# Patient Record
Sex: FEMALE | Race: ASIAN | Employment: PART TIME | URBAN - METROPOLITAN AREA
[De-identification: names, ages, dates, MRNs, and addresses within clinical notes are randomized per-mention and may not be internally consistent; named-entity substitution may affect disease eponyms.]

---

## 2017-02-28 DIAGNOSIS — F41.8 DEPRESSION WITH ANXIETY: ICD-10-CM

## 2017-03-01 RX ORDER — ZOLPIDEM TARTRATE 5 MG/1
TABLET ORAL
Qty: 30 TABLET | Refills: 0 | Status: SHIPPED | OUTPATIENT
Start: 2017-03-01 | End: 2017-03-28

## 2017-03-01 NOTE — TELEPHONE ENCOUNTER
PHQ-9 SCORE 10/31/2016 11/7/2016   Total Score 22 10       AZIZA-7 SCORE 10/31/2016 11/7/2016   Total Score 18 8       Please inform patient, refill/prescriptioni approved. Please drop off prescription at pharmacy.   Please also update her PHQ 9  Thanks

## 2017-03-01 NOTE — TELEPHONE ENCOUNTER
Zolpidem      Last Written Prescription Date:  11/07/2016  Last Fill Quantity: 30,   # refills: 3  [place] [facility] [place]  [facility] prescribing provider: 12/05/2016  Future Office visit:       Routing refill request to provider for review/approval because:  Drug not on the [facility] or  Health refill protocol or controlled substance    Shalom JOHNSON (R)

## 2017-03-01 NOTE — TELEPHONE ENCOUNTER
Routing refill request to provider for review/approval because:  Drug not on the [facility] refill protocol       Tori Melton RN,   [facility]

## 2017-03-01 NOTE — TELEPHONE ENCOUNTER
Rx faxed to pharmacy, pt stopped in and filled out Phq90 and placed on Zeenat's desk for review.  Lizeth COREAA

## 2017-03-02 ASSESSMENT — ANXIETY QUESTIONNAIRES
3. WORRYING TOO MUCH ABOUT DIFFERENT THINGS: SEVERAL DAYS
6. BECOMING EASILY ANNOYED OR IRRITABLE: SEVERAL DAYS
5. BEING SO RESTLESS THAT IT IS HARD TO SIT STILL: SEVERAL DAYS
7. FEELING AFRAID AS IF SOMETHING AWFUL MIGHT HAPPEN: NOT AT ALL
2. NOT BEING ABLE TO STOP OR CONTROL WORRYING: SEVERAL DAYS
GAD7 TOTAL SCORE: 7
1. FEELING NERVOUS, ANXIOUS, OR ON EDGE: MORE THAN HALF THE DAYS

## 2017-03-02 ASSESSMENT — PATIENT HEALTH QUESTIONNAIRE - PHQ9: 5. POOR APPETITE OR OVEREATING: SEVERAL DAYS

## 2017-03-02 NOTE — TELEPHONE ENCOUNTER
PHQ-9 SCORE 10/31/2016 11/7/2016 3/2/2017   Total Score 22 10 10       AZIZA-7 SCORE 10/31/2016 11/7/2016 3/2/2017   Total Score 18 8 7     Reviewed scores and would like her to make a follow up appointment  Also is seeing counseling as well?  Thanks

## 2017-03-03 ASSESSMENT — ANXIETY QUESTIONNAIRES: GAD7 TOTAL SCORE: 7

## 2017-03-03 ASSESSMENT — PATIENT HEALTH QUESTIONNAIRE - PHQ9: SUM OF ALL RESPONSES TO PHQ QUESTIONS 1-9: 10

## 2017-03-06 NOTE — TELEPHONE ENCOUNTER
Called patient's number on file and was informed patient is currently at work. Will try again later with patient.    MATTHEW Stringer

## 2017-03-14 NOTE — TELEPHONE ENCOUNTER
Attempt #2    Left message for patient to call back to schedule an appointment.    Nimisha Verdugo CMA

## 2017-03-16 NOTE — TELEPHONE ENCOUNTER
Spoke with patient she knows she scored lower on the PHQ9 and the GAD7.  She wanted to write you a note but knew you would call her anyway.  She has been seeing a counselor anonymously through her work.  She has had 4 sessions and thinks she is now where she needs to be.  She said it has nothing to do with everything in the past.  It's about going back to school and not working for 9 months that is giving her anxiety, but it is a good anxiety.  She doesn't feel she needs a follow up appointment with you at this time.  She will call us if things change or worsen.    Message routed to Nany Verdugo CMA

## 2017-03-16 NOTE — TELEPHONE ENCOUNTER
PHQ-9 SCORE 10/31/2016 11/7/2016 3/2/2017   Total Score 22 10 10       AZIZA-7 SCORE 10/31/2016 11/7/2016 3/2/2017   Total Score 18 8 7   Noted

## 2017-03-28 DIAGNOSIS — G47.00 PERSISTENT DISORDER OF INITIATING OR MAINTAINING SLEEP: Primary | ICD-10-CM

## 2017-03-28 DIAGNOSIS — F41.8 DEPRESSION WITH ANXIETY: ICD-10-CM

## 2017-03-30 NOTE — TELEPHONE ENCOUNTER
[facility] Call Center    Phone Message    Name of Caller: Radha    Phone Number: Cell number on file:    Telephone Information:   Mobile none       Best time to return call: any    May a detailed message be left on voicemail: yes    Relation to patient: Self    Reason for Call: Other: Patient calling to follow up on refill request. Please advise.     Action Taken: Message routed to:  Primary Care p [phone]

## 2017-03-31 RX ORDER — ZOLPIDEM TARTRATE 5 MG/1
TABLET ORAL
Qty: 30 TABLET | Refills: 0 | Status: SHIPPED | OUTPATIENT
Start: 2017-03-31 | End: 2017-04-25

## 2017-03-31 NOTE — TELEPHONE ENCOUNTER
Routing refill request to provider for review/approval because:  Drug not on the [facility] refill protocol      Disp Refills Start End VINICIO      zolpidem (AMBIEN) 5 MG tablet 30 tablet 0 3/1/2017  No     Sig: TAKE 1 TABLET BY MOUTH AT BEDTIME AS NEEDED FOR SLEEP     Rupali Glover RN, [facility]

## 2017-05-01 ENCOUNTER — OFFICE VISIT (OUTPATIENT)
Dept: PEDIATRICS | Facility: CLINIC | Age: 27
End: 2017-05-01
Payer: COMMERCIAL

## 2017-05-01 VITALS
TEMPERATURE: 97.4 F | WEIGHT: 107.7 LBS | HEIGHT: 62 IN | DIASTOLIC BLOOD PRESSURE: 55 MMHG | BODY MASS INDEX: 19.82 KG/M2 | HEART RATE: 85 BPM | OXYGEN SATURATION: 98 % | SYSTOLIC BLOOD PRESSURE: 99 MMHG

## 2017-05-01 DIAGNOSIS — F41.8 DEPRESSION WITH ANXIETY: Primary | ICD-10-CM

## 2017-05-01 PROCEDURE — 99213 OFFICE O/P EST LOW 20 MIN: CPT | Performed by: NURSE PRACTITIONER

## 2017-05-01 ASSESSMENT — ANXIETY QUESTIONNAIRES
1. FEELING NERVOUS, ANXIOUS, OR ON EDGE: SEVERAL DAYS
GAD7 TOTAL SCORE: 6
6. BECOMING EASILY ANNOYED OR IRRITABLE: SEVERAL DAYS
5. BEING SO RESTLESS THAT IT IS HARD TO SIT STILL: SEVERAL DAYS
2. NOT BEING ABLE TO STOP OR CONTROL WORRYING: SEVERAL DAYS
3. WORRYING TOO MUCH ABOUT DIFFERENT THINGS: SEVERAL DAYS
7. FEELING AFRAID AS IF SOMETHING AWFUL MIGHT HAPPEN: NOT AT ALL

## 2017-05-01 ASSESSMENT — PATIENT HEALTH QUESTIONNAIRE - PHQ9: 5. POOR APPETITE OR OVEREATING: SEVERAL DAYS

## 2017-05-01 NOTE — PATIENT INSTRUCTIONS
PLAN:   1.   Symptomatic therapy suggested: Continue current medications.  2.  Orders Placed This Encounter   Procedures     MENTAL HEALTH REFERRAL       3. Patient needs to follow up in if no improvement,or sooner if worsening of symptoms or other symptoms develop.  CONSULTATION/REFERRAL to counselor  Schedule a physical in the next 6 months.    It was a pleasure seeing you today at the [facility]. Thank you for allowing us to care for you today. We truly hope we provided you with the excellent service you deserve. Please let us know if there is anything else we can do for you so we can be sure you are leaving completley satisfied with your care experience.       General information about your clinic   Clinic Hours Lab Hours (Appointments are required)   Mon-Thurs: 7:30 AM - 7 PM Mon-Thurs: 7:30 AM - 7 PM   Fri: 7:30 AM - 5 PM Fri: 7:30 AM - 5 PM        After Hours Nurse Advise & Appts:  Patricia Nurse Advisors: [phone]  Patricia On Call: to make appointments anytime: [phone] On Call Physician: call [phone] and answering service will page the on call physician.        For urgent appointments, please call [phone] and ask for the triage nurse or your care team clinic nurse.  How to contact my care team:  MyChart: [email or web address]   Phone: [phone]   Fax: [phone]       [facility]:   Phone: [phone]  Hours: 8:00 AM - 6:00 PM  Medication Refills:  Call your pharmacy and they will forward the refill to us. Please allow 3 business days for your refills to be completed.       Normal or non-critical lab and imaging results will be communicated to you by MyChart, letter or phone within 7 days.  If you do not hear from us within 10 days, please call the clinic. If you have a critical or abnormal lab result, we will notify you by phone as soon as possible.       We now have PWIC (Pediatric Walk in Care)  Monday-Friday from 7:30-4. Simply walk in  and be seen for your urgent needs like cough, fever, rash, diarrhea or vomiting, pink eye, UTI. No appointments needed. Ask one of the team for more information      -Your Care Team:    Dr. Tony Tafoya - Internal Medicine  Dr. Caitlyn Dumont - Internal Medicine/Pediatrics   Dr. Alex Eastman - Family Medicine  Dr. Cee Perez - Pediatrics  Dr. Jayla Vasquez - Pediatrics  Nany Call CNP - Family Practice Nurse Practitioner

## 2017-05-01 NOTE — NURSING NOTE
"Chief Complaint   Patient presents with     Depression     Anxiety       Initial BP 99/55 (BP Location: Right arm, Patient Position: Chair, Cuff Size: Adult Regular)  Pulse 85  Temp 97.4  F (36.3  C) (Temporal)  Ht 5' 2.25\" (1.581 m)  Wt 107 lb 11.2 oz (48.9 kg)  LMP 04/04/2017  SpO2 98%  Breastfeeding? No  BMI 19.54 kg/m2 Estimated body mass index is 19.54 kg/(m^2) as calculated from the following:    Height as of this encounter: 5' 2.25\" (1.581 m).    Weight as of this encounter: 107 lb 11.2 oz (48.9 kg).  Medication Reconciliation: complete      MATTHEW Stringer      "

## 2017-05-01 NOTE — PROGRESS NOTES
SUBJECTIVE:                                                    Radha Galvan is a 27 year old female who presents to clinic today for the following health issues:    Depression and Anxiety Follow-Up    Status since last visit: No change    Other associated symptoms:still feel anxious    Complicating factors:     Significant life event: Yes-  Going back to school full time at the end of the summer      Current substance abuse: None    PHQ-9 SCORE 11/7/2016 3/2/2017 5/1/2017   Total Score 10 10 7     AZIZA-7 SCORE 11/7/2016 3/2/2017 5/1/2017   Total Score 8 7 6     Has been seeing counseling initially through [facility] and now only sporadically with new counselor   Went back to school and working full time and harder now to make time to do this  Feels like only anxiety now is going back to full time student and not have a full time job.   Will be graduating next year if all goes well.   The ambien helps some to help her sleep   Has been using more of her PTO lately but not because she is sad but mostly to catch up on school work. 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   Been trying to go to the gym more as well.   Was on the Lexapro and then after seeing the counselor she felt good enough to quit the medication    PHQ-9  English      PHQ-9   Any Language     GAD7       Amount of exercise or physical activity: 4-5 days/week for an average of greater than 60 minutes    Problems taking medications regularly: No    Medication side effects: none    Diet: cutting out diary products      Problem list and histories reviewed & adjusted, as indicated.  Additional history: as documented    Patient Active Problem List   Diagnosis     Depression with anxiety     History reviewed. No pertinent surgical history.    Social History   Substance Use Topics     Smoking status: Current Every Day Smoker     Packs/day: 0.30     Types: Cigarettes      "Smokeless tobacco: Never Used     Alcohol use 0.0 oz/week     0 Standard drinks or equivalent per week      Comment: 3-5 glasses a night     History reviewed. No pertinent family history.      Current Outpatient Prescriptions   Medication Sig Dispense Refill     zolpidem (AMBIEN) 5 MG tablet TAKE 1 TABLET BY MOUTH AT BEDTIME AS NEEDED FOR SLEEP 30 tablet 0     albuterol (ALBUTEROL) 108 (90 BASE) MCG/ACT inhaler Inhale 1 puff into the lungs daily       Omega-3 Fatty Acids (OMEGA-3 FISH OIL PO)        Allergies   Allergen Reactions     Hmg-Coa-R Inhibitors      \"cockroaches     Isoflavones      Mold      Ragweeds Itching     Sulfa Drugs        Reviewed and updated as needed this visit by clinical staff  Tobacco  Allergies  Meds  Med Hx  Surg Hx  Fam Hx  Soc Hx      Reviewed and updated as needed this visit by Provider         ROS:  CONSTITUTIONAL:NEGATIVE for fever, chills, change in weight  RESP:NEGATIVE for significant cough or SOB  CV: NEGATIVE for chest pain, palpitations or peripheral edema  GI: NEGATIVE for nausea, abdominal pain, heartburn, or change in bowel habits  MUSCULOSKELETAL: NEGATIVE for significant arthralgias or myalgia  NEURO: NEGATIVE for weakness, dizziness or paresthesias  PSYCHIATRIC: NEGATIVE for changes in mood or affect and no thoughts of hurting self or others  POSITIVE forHx anxiety and Hx depression    OBJECTIVE:                                                    BP 99/55 (BP Location: Right arm, Patient Position: Chair, Cuff Size: Adult Regular)  Pulse 85  Temp 97.4  F (36.3  C) (Temporal)  Ht 5' 2.25\" (1.581 m)  Wt 107 lb 11.2 oz (48.9 kg)  LMP 04/04/2017  SpO2 98%  Breastfeeding? No  BMI 19.54 kg/m2  Body mass index is 19.54 kg/(m^2).  GENERAL APPEARANCE: alert, active and no distress  NECK: no adenopathy and thyroid normal to palpation  RESP: lungs clear to auscultation - no rales, rhonchi or wheezes  CV: regular rates and rhythm  MS: extremities normal- no gross " deformities noted  NEURO: Normal strength and tone, mentation intact and speech normal  PSYCH: mentation appears normal and affect normal/bright  MENTAL STATUS EXAM:  Appearance/Behavior: No apparent distress, Casually groomed and Dressed appropriately for weather  Speech: Normal  Mood/Affect: normal affect  Insight: Adequate    Diagnostic Test Results:  none      ASSESSMENT/PLAN:                                                      Radha was seen today for depression and anxiety.    Diagnoses and all orders for this visit:    Depression with anxiety  -     MENTAL HEALTH REFERRAL  PLAN:   1.   Reviewed concept of depression as function of biochemical imbalance of neurotransmitters/rationale for treatment.  Risks and benefits of medication(s) reviewed with patient.  Questions answered.  Counseling advised  Refer to psychologist  Followup appointment in 6 month(s)  Patient advised immediate presentation to hospital for suicidal thought, etc.      Symptomatic therapy suggested: Continue current medications.  2.  Orders Placed This Encounter   Procedures     MENTAL HEALTH REFERRAL       3. Patient needs to follow up in if no improvement,or sooner if worsening of symptoms or other symptoms develop.  CONSULTATION/REFERRAL to counselor  Schedule a physical in the next 6 months.    See Patient Instructions    YVES Villarreal [facility]

## 2017-05-01 NOTE — MR AVS SNAPSHOT
After Visit Summary   5/1/2017    Radha Galvan    MRN: [id number]           Patient Information     Date Of Birth          1990        Visit Information        Provider Department      5/1/2017 1:40 PM Nany Call APRN [facility]        Today's Diagnoses     Depression with anxiety    -  1      Care Instructions    PLAN:   1.   Symptomatic therapy suggested: Continue current medications.  2.  Orders Placed This Encounter   Procedures     MENTAL HEALTH REFERRAL       3. Patient needs to follow up in if no improvement,or sooner if worsening of symptoms or other symptoms develop.  CONSULTATION/REFERRAL to counselor  Schedule a physical in the next 6 months.    It was a pleasure seeing you today at the [facility]. Thank you for allowing us to care for you today. We truly hope we provided you with the excellent service you deserve. Please let us know if there is anything else we can do for you so we can be sure you are leaving completley satisfied with your care experience.       General information about your clinic   Clinic Hours Lab Hours (Appointments are required)   Mon-Thurs: 7:30 AM - 7 PM Mon-Thurs: 7:30 AM - 7 PM   Fri: 7:30 AM - 5 PM Fri: 7:30 AM - 5 PM        After Hours Nurse Advise & Appts:  Patricia Nurse Advisors: [phone]  Patricia On Call: to make appointments anytime: [phone] On Call Physician: call [phone] and answering service will page the on call physician.        For urgent appointments, please call [phone] and ask for the triage nurse or your care team clinic nurse.  How to contact my care team:  MyChart: [email or web address]   Phone: [phone]   Fax: [phone]       [facility]:   Phone: [phone]  Hours: 8:00 AM - 6:00 PM  Medication Refills:  Call your pharmacy and they will forward the refill to us. Please allow 3 business days for your refills to be completed.        Normal or non-critical lab and imaging results will be communicated to you by MyChart, letter or phone within 7 days.  If you do not hear from us within 10 days, please call the clinic. If you have a critical or abnormal lab result, we will notify you by phone as soon as possible.       We now have PWIC (Pediatric Walk in Care)  Monday-Friday from 7:30-4. Simply walk in and be seen for your urgent needs like cough, fever, rash, diarrhea or vomiting, pink eye, UTI. No appointments needed. Ask one of the team for more information      -Your Care Team:    Dr. Tony Tafoya - Internal Medicine  Dr. Caitlyn Dumont - Internal Medicine/Pediatrics   Dr. Alex Eastman - Family Medicine  Dr. Cee Perez - Pediatrics  Dr. Jayla Vasquez - Pediatrics  Nany Call CNP - Family Practice Nurse Practitioner                         Follow-ups after your visit        Additional Services     MENTAL HEALTH REFERRAL       Your provider has referred you to: [facility]: [facility] - Counseling (Individual/Couples/Family) - [facility]     All scheduling is subject to the client's specific insurance plan & benefits, provider/location availability, and provider clinical specialities.  Please arrive 15 minutes early for your first appointment and bring your completed paperwork.    Please be aware that coverage of these services is subject to the terms and limitations of your health insurance plan.  Call member services at your health plan with any benefit or coverage questions.                  Who to contact     If you have questions or need follow up information about today's clinic visit or your schedule please contact [facility] directly at [phone].  Normal or non-critical lab and imaging results will be communicated to you by MyChart, letter or phone within 4 business days after the clinic has received the results. If you do not hear from us within 7 days, please contact the clinic through  "MyChart or phone. If you have a critical or abnormal lab result, we will notify you by phone as soon as possible.  Submit refill requests through [employer] or call your pharmacy and they will forward the refill request to us. Please allow 3 business days for your refill to be completed.          Additional Information About Your Visit        [employer]ent Information     Blazent is an electronic gateway that provides easy, online access to your medical records. With Blazent, you can request a clinic appointment, read your test results, renew a prescription or communicate with your care team.     To sign up for [employer] visit the website at [email or web address]   You will be asked to enter the access code listed below, as well as some personal information. Please follow the directions to create your username and password.     Your access code is: [email or web address]  Expires: 2017  2:16 PM     Your access code will  in 90 days. If you need help or a new code, please contact your [facility] or call [phone] for assistance.        Care EveryWhere ID     This is your Care EveryWhere ID. This could be used by other organizations to access your [employer] medical records  [id number]        Your Vitals Were     Pulse Temperature Height Last Period Pulse Oximetry Breastfeeding?    85 97.4  F (36.3  C) (Temporal) 5' 2.25\" (1.581 m) 2017 98% No    BMI (Body Mass Index)                   19.54 kg/m2            Blood Pressure from Last 3 Encounters:   17 99/55   16 100/70   10/31/16 96/60    Weight from Last 3 Encounters:   17 107 lb 11.2 oz (48.9 kg)   16 103 lb 8 oz (46.9 kg)   10/31/16 100 lb 3.2 oz (45.5 kg)              We Performed the Following     MENTAL HEALTH REFERRAL        Primary Care Provider Office Phone # Fax #    YVES Villarreal [phone]       [facility] [address]  [facility] [address]   "      Thank you!     Thank you for choosing [facility]  for your care. Our goal is always to provide you with excellent care. Hearing back from our patients is one way we can continue to improve our services. Please take a few minutes to complete the written survey that you may receive in the mail after your visit with us. Thank you!             Your Updated Medication List - Protect others around you: Learn how to safely use, store and throw away your medicines at www.disposemymeds.org.          This list is accurate as of: 5/1/17  2:16 PM.  Always use your most recent med list.                   Brand Name Dispense Instructions for use    albuterol 108 (90 BASE) MCG/ACT Inhaler   Generic drug:  albuterol      Inhale 1 puff into the lungs daily       OMEGA-3 FISH OIL PO          zolpidem 5 MG tablet    AMBIEN    30 tablet    TAKE 1 TABLET BY MOUTH AT BEDTIME AS NEEDED FOR SLEEP

## 2017-05-02 ASSESSMENT — PATIENT HEALTH QUESTIONNAIRE - PHQ9: SUM OF ALL RESPONSES TO PHQ QUESTIONS 1-9: 7

## 2017-05-02 ASSESSMENT — ANXIETY QUESTIONNAIRES: GAD7 TOTAL SCORE: 6

## 2017-06-13 DIAGNOSIS — F41.8 DEPRESSION WITH ANXIETY: ICD-10-CM

## 2017-06-13 RX ORDER — ZOLPIDEM TARTRATE 5 MG/1
TABLET ORAL
Qty: 30 TABLET | Refills: 0 | Status: SHIPPED | OUTPATIENT
Start: 2017-06-20 | End: 2017-07-10

## 2017-06-13 NOTE — TELEPHONE ENCOUNTER
Routing refill request to provider for review/approval because:  Drug not on the [facility] refill protocol     zolpidem (AMBIEN) 5 MG tablet  Last Written Prescription Date: 5/22/2017  Last Fill Quantity: 30,  # refills: 0   [place] [facility] [place] [facility] prescribing provider: 5/1/2017    Kendra Ivan RN

## 2017-06-13 NOTE — TELEPHONE ENCOUNTER
Patient advised script for Ambien faxed to [facility] pharmacy and cannot fill until 06/22 due to last fill was on 05/22/17.  Patient stated understanding.    Nimisha Verdugo CMA

## 2017-06-13 NOTE — TELEPHONE ENCOUNTER
Please inform patient, refill/prescriptioni approved. Please fax prescription to designated pharmacy.   However note last refill was 5/22 so can not fill until next week

## 2017-07-10 DIAGNOSIS — F41.8 DEPRESSION WITH ANXIETY: ICD-10-CM

## 2017-07-10 RX ORDER — ZOLPIDEM TARTRATE 5 MG/1
TABLET ORAL
Qty: 30 TABLET | Refills: 0 | Status: SHIPPED | OUTPATIENT
Start: 2017-07-20 | End: 2017-08-10

## 2017-07-10 NOTE — TELEPHONE ENCOUNTER
Routing refill request to provider for review/approval because:  Drug not on the [facility] refill protocol     Zolpidem      Last Written Prescription Date: 6/20/17  Last Fill Quantity: 30,  # refills: 0   Last Office Visit with [facility], [facility] or [facility] prescribing provider: 5/1/17         Rupali Glover RN, [facility]

## 2017-07-11 NOTE — TELEPHONE ENCOUNTER
Please inform patient, refill/prescriptioni approved. Please fax prescription to designated pharmacy.   However last script was on 6/20 so can not fill until next week.

## 2017-07-11 NOTE — TELEPHONE ENCOUNTER
Patient advised Ambien script faxed to [facility] pharmacy.  Can not fill script until next week due to last script was on 06/20.      Nimisha Verdugo CMA

## 2017-08-09 DIAGNOSIS — F41.8 DEPRESSION WITH ANXIETY: ICD-10-CM

## 2017-08-09 RX ORDER — ZOLPIDEM TARTRATE 5 MG/1
TABLET ORAL
Qty: 30 TABLET | Refills: 0 | Status: CANCELLED | OUTPATIENT
Start: 2017-08-09

## 2017-08-09 NOTE — TELEPHONE ENCOUNTER
[facility] Call Center    Phone Message    Name of Caller: Radha    Phone Number: Home number on file [phone] (home)    Best time to return call: any    May a detailed message be left on voicemail: yes    Relation to patient: Self    Reason for Call: Patient is requesting Refill: Zolpidem, Ambien, 5 mg Tablet, patient taking last tablet today  Pharm: [facility]  Action Taken: Message routed to:  Primary Care p [phone]

## 2017-08-10 RX ORDER — ZOLPIDEM TARTRATE 5 MG/1
5 TABLET ORAL
Qty: 30 TABLET | Refills: 0 | Status: SHIPPED | OUTPATIENT
Start: 2017-08-19 | End: 2017-09-15

## 2017-08-10 NOTE — TELEPHONE ENCOUNTER
Please inform patient, refill/prescriptioni approved to be filled on 8/19 as too early for refill at this time . Please fax prescription to designated pharmacy.

## 2017-08-10 NOTE — TELEPHONE ENCOUNTER
Left voicemail message for patient that the script she requested has been sent to [facility] and is ok to be filled on 08/19/17 as too early for refill at this time per Zeenat Call.    Nimisha Verdugo CMA

## 2017-08-10 NOTE — TELEPHONE ENCOUNTER
Routing refill request to provider for review/approval because:  Drug not on the [facility] refill protocol   MARYLOUUMU Zeenat, pt stated she took her last pill yesterday 8/9, but if patient were taking 1 tab daily she should still have 9-10 tablets left    Zolpidem (AMBIEN) 5 mg tablet   Last Written Prescription Date: 7/20/2017  Last Fill Quantity: 30,  # refills: 0   Last Office Visit with [facility] or [facility] prescribing provider: 5/1/2017                                           Kendra Ivan RN

## 2017-09-15 DIAGNOSIS — F41.8 DEPRESSION WITH ANXIETY: ICD-10-CM

## 2017-09-15 RX ORDER — ZOLPIDEM TARTRATE 5 MG/1
TABLET ORAL
Qty: 30 TABLET | Refills: 1 | Status: SHIPPED | OUTPATIENT
Start: 2017-09-15 | End: 2017-11-08

## 2017-09-15 NOTE — TELEPHONE ENCOUNTER
Please inform patient, refill/prescriptioni approved. Please fax prescription to designated pharmacy.   Please ask provider there sign off

## 2017-09-17 ENCOUNTER — HEALTH MAINTENANCE LETTER (OUTPATIENT)
Age: 27
End: 2017-09-17

## 2017-11-08 DIAGNOSIS — F41.8 DEPRESSION WITH ANXIETY: ICD-10-CM

## 2017-11-08 RX ORDER — ZOLPIDEM TARTRATE 5 MG/1
TABLET ORAL
Qty: 30 TABLET | Refills: 1 | Status: SHIPPED | OUTPATIENT
Start: 2017-11-08 | End: 2018-01-04

## 2017-11-08 NOTE — TELEPHONE ENCOUNTER
Routing refill request to provider for review/approval because:  Drug not on the [facility] refill protocol     zolpidem (AMBIEN) 5 MG tablet    Last Written Prescription Date: 9/15/2017  Last Fill Quantity: 30,  # refills: 1   Last [facility] Visit with [facility] or [facility] prescribing provider: 5/1/2017                                           Kendra Ivan RN

## 2018-01-04 DIAGNOSIS — F41.8 DEPRESSION WITH ANXIETY: ICD-10-CM

## 2018-01-04 NOTE — TELEPHONE ENCOUNTER
Patient is calling to follow up on refill request. Patient informed of 72 hour turn around time. Please advise.

## 2018-01-05 RX ORDER — ZOLPIDEM TARTRATE 5 MG/1
TABLET ORAL
Qty: 30 TABLET | Refills: 1 | Status: SHIPPED | OUTPATIENT
Start: 2018-01-05 | End: 2018-02-21

## 2018-01-05 NOTE — TELEPHONE ENCOUNTER
Routing refill request to provider for review/approval because:  Drug not on the [facility] refill protocol     zolpidem (AMBIEN) 5 MG tablet  Last Written Prescription Date: 11/8/2017  Last Fill Quantity: 30,  # refills: 1   Last Office Visit with [facility], [facility] or [facility] prescribing provider: 5/1/2017    Kendra Ivan RN

## 2018-02-21 ENCOUNTER — TELEPHONE (OUTPATIENT)
Dept: PEDIATRICS | Facility: CLINIC | Age: 28
End: 2018-02-21

## 2018-02-21 DIAGNOSIS — F41.8 DEPRESSION WITH ANXIETY: ICD-10-CM

## 2018-02-21 DIAGNOSIS — G47.00 INSOMNIA, UNSPECIFIED TYPE: Primary | ICD-10-CM

## 2018-02-21 RX ORDER — ZOLPIDEM TARTRATE 5 MG/1
5 TABLET ORAL
Qty: 30 TABLET | Refills: 1 | Status: SHIPPED | OUTPATIENT
Start: 2018-02-21 | End: 2018-04-15

## 2018-02-21 NOTE — TELEPHONE ENCOUNTER
Patient would like to request a refill for zolpidem (AMBIEN) 5 MG tablet [[id number]] (Order [id number])  [facility] [address] in [place]. Please advise. She is currently out.

## 2018-02-21 NOTE — TELEPHONE ENCOUNTER
zolpidem (AMBIEN) 5 MG tablet     Last Written Prescription Date:  1/5/18  Last Fill Quantity: 30,   # refills: 1  Last Office Visit: 5/1/17  Future Office visit:  No future appointment    Routing refill request to provider for review/approval because:  Drug not on the [facility], [facility] or [facility] refill protocol or controlled substance    MATTHEW Stringer

## 2018-02-22 DIAGNOSIS — G47.00 INSOMNIA, UNSPECIFIED TYPE: ICD-10-CM

## 2018-02-22 RX ORDER — ZOLPIDEM TARTRATE 5 MG/1
TABLET ORAL
Qty: 30 TABLET | Refills: 0 | OUTPATIENT
Start: 2018-02-22

## 2018-02-22 NOTE — TELEPHONE ENCOUNTER
Duplicate Request- this refill (same medication, sig, and indication) has already been processed and sent to pharmacy within the past today.    Geetha Rock CMA        2/21/18 12:22 PM   Note      Script faxed to pharmacy. Patient informed of script faxed.     MATTHEW Stringer            Last Written Prescription Date:  zolpidem (AMBIEN) 5 MG tablet 30 tablet 1 2/21/2018  No      Sig: Take 1 tablet (5 mg) by mouth nightly as needed     Class: Local Print     Route: Oral     Order: [id number]       Printout Tracking      External Result Report       Pharmacy      [facility] [id number] [facility] [address] [facility] [address]            Medication refused.    Tori Melton RN,   M. [facility]

## 2018-04-15 DIAGNOSIS — G47.00 INSOMNIA, UNSPECIFIED TYPE: ICD-10-CM

## 2018-04-16 RX ORDER — ZOLPIDEM TARTRATE 5 MG/1
TABLET ORAL
Qty: 30 TABLET | Refills: 1 | Status: SHIPPED | OUTPATIENT
Start: 2018-04-16 | End: 2018-06-10

## 2018-04-16 NOTE — TELEPHONE ENCOUNTER
Ambien script faxed to [facility] pharmacy [address].    Patient advised due for appt before next refill.    Nimisha Verdugo CMA

## 2018-04-16 NOTE — TELEPHONE ENCOUNTER
Please inform patient, refill/prescriptioni approved. Please fax prescription to designated pharmacy.   Will need follow up appointment before next refill

## 2018-04-16 NOTE — TELEPHONE ENCOUNTER
Routing refill request to provider for review/approval because:  Drug not on the [facility] refill protocol     zolpidem (AMBIEN) 5 MG tablet 30 tablet 1 2/21/2018  No   Sig: Take 1 tablet (5 mg) by mouth nightly as needed   Class: Local Print     Last OV with Nany Call CNP: 5/1/2017    No future apts.     Kendra Ivan RN

## 2018-06-10 DIAGNOSIS — G47.00 INSOMNIA, UNSPECIFIED TYPE: ICD-10-CM

## 2018-06-11 RX ORDER — ZOLPIDEM TARTRATE 5 MG/1
TABLET ORAL
Qty: 30 TABLET | Refills: 0 | Status: SHIPPED | OUTPATIENT
Start: 2018-06-11 | End: 2018-07-09

## 2018-06-11 NOTE — TELEPHONE ENCOUNTER
Script faxed to pharmacy. Called and left VM for patient to call back to schedule an appointment with pcp.    MATTHEW Stringer

## 2018-06-11 NOTE — TELEPHONE ENCOUNTER
Routing refill request to provider for review/approval because:  Drug not on the [facility] refill protocol     zolpidem (AMBIEN) 5 MG tablet 30 tablet 1 4/16/2018     Sig: TAKE 1 TABLET BY MOUTH NIGHTLY AS NEEDED    Class: Local Print      Last OV with Nany Call CNP: 5/1/2017    No future apts.     Kendra Ivan RN

## 2018-06-12 NOTE — TELEPHONE ENCOUNTER
Future Office visit:    Next 5 appointments (look out 90 days)     Jun 22, 2018  9:10 AM CDT   Return Visit with YVES Villarreal CNP   [facility]    [id number] [place] [id number]   [phone]

## 2018-07-09 DIAGNOSIS — G47.00 INSOMNIA, UNSPECIFIED TYPE: ICD-10-CM

## 2018-07-09 RX ORDER — ZOLPIDEM TARTRATE 5 MG/1
TABLET ORAL
Qty: 30 TABLET | Refills: 0 | Status: SHIPPED | OUTPATIENT
Start: 2018-07-09 | End: 2018-08-06

## 2018-07-09 NOTE — LETTER
July 19, 2018      Radha Galvan  [address]  [place] [address]        Dear Radha Galvan,    The refill request made by your pharmacy was received at the clinic. At this time you are due in the clinic for a follow up appointment. A one time joanne refill has been sent to your pharmacy.     In order to ensure that we are providing the best quality care, we would like to remind you that you are due for Return appointment with Zeenat Call.      We have been unable to reach you by phone. Please call your clinic or use [employer] to make an appointment with your provider before you run out of medication. This will prevent a delay in your next refill. Please let us know if you have any questions and we would be happy to help.     Thank you for trusting us with your care.    Sincerely,     [facility] [place] [facility]  [phone]

## 2018-07-09 NOTE — TELEPHONE ENCOUNTER
Please inform patient, refill/prescriptioni approved. Please fax prescription to designated pharmacy.   Will need appointment before next refill can be given

## 2018-07-09 NOTE — TELEPHONE ENCOUNTER
Routing refill request to provider for review/approval because:  Drug not on the [facility] refill protocol     zolpidem (AMBIEN) 5 MG tablet 30 tablet 0 6/11/2018  No   Sig: TAKE 1 TABLET BY MOUTH NIGHTLY AS NEEDED     Last OV with Nany Call CNP: 5/1/2017    No future apts.     Kendra Ivan RN

## 2018-07-10 NOTE — TELEPHONE ENCOUNTER
Attempt # 1    Left message for patient stating prescription has been sent to the pharmacy. Advised patient to call clinic to schedule Return appointment with Zeenat Fuller. Clinic number and Mychart option given.    Raisa Stark  Primary Care   [facility] [place]

## 2018-07-19 NOTE — TELEPHONE ENCOUNTER
Attempt #3    No appointment made.  Chart letter created and sent.    Raisa Stark  Primary Care   [facility]

## 2018-08-06 DIAGNOSIS — G47.00 INSOMNIA, UNSPECIFIED TYPE: ICD-10-CM

## 2018-08-06 RX ORDER — ZOLPIDEM TARTRATE 5 MG/1
TABLET ORAL
Qty: 30 TABLET | Refills: 0 | Status: SHIPPED | OUTPATIENT
Start: 2018-08-06 | End: 2018-08-17

## 2018-08-06 NOTE — TELEPHONE ENCOUNTER
Patient would like to know if she can get a joanne refill until appointment on Aug 17th. Please call patient at [phone].

## 2018-08-06 NOTE — TELEPHONE ENCOUNTER
Routing refill request to provider for review/approval because:  Drug not on the [facility] refill protocol     zolpidem (AMBIEN) 5 MG tablet 30 tablet 0 7/9/2018  No   Sig: TAKE 1 TABLET BY MOUTH EVERY DAY AT BEDTIME AS NEEDED     Last OV with Nany Call CNP: 5/1/2017    Next 5 appointments (look out 90 days)     Aug 17, 2018  9:10 AM CDT   Return Visit with YVES Villarreal CNP   [facility]    [address] [place] [id number]   [phone]                Kendra Ivan RN

## 2018-08-17 ENCOUNTER — OFFICE VISIT (OUTPATIENT)
Dept: PEDIATRICS | Facility: CLINIC | Age: 28
End: 2018-08-17
Payer: COMMERCIAL

## 2018-08-17 VITALS
OXYGEN SATURATION: 96 % | SYSTOLIC BLOOD PRESSURE: 102 MMHG | HEART RATE: 78 BPM | TEMPERATURE: 98.2 F | HEIGHT: 62 IN | DIASTOLIC BLOOD PRESSURE: 66 MMHG | BODY MASS INDEX: 18.29 KG/M2 | WEIGHT: 99.4 LBS

## 2018-08-17 DIAGNOSIS — G47.00 INSOMNIA, UNSPECIFIED TYPE: ICD-10-CM

## 2018-08-17 DIAGNOSIS — Z13.1 SCREENING FOR DIABETES MELLITUS: ICD-10-CM

## 2018-08-17 DIAGNOSIS — Z13.6 CARDIOVASCULAR SCREENING; LDL GOAL LESS THAN 160: ICD-10-CM

## 2018-08-17 DIAGNOSIS — L30.9 ECZEMA, UNSPECIFIED TYPE: ICD-10-CM

## 2018-08-17 DIAGNOSIS — F41.8 DEPRESSION WITH ANXIETY: Primary | ICD-10-CM

## 2018-08-17 DIAGNOSIS — Z13.29 SCREENING FOR THYROID DISORDER: ICD-10-CM

## 2018-08-17 DIAGNOSIS — R63.4 LOSS OF WEIGHT: ICD-10-CM

## 2018-08-17 LAB
ALBUMIN SERPL-MCNC: 4.2 G/DL (ref 3.4–5)
ALP SERPL-CCNC: 74 U/L (ref 40–150)
ALT SERPL W P-5'-P-CCNC: 23 U/L (ref 0–50)
ANION GAP SERPL CALCULATED.3IONS-SCNC: 6 MMOL/L (ref 3–14)
AST SERPL W P-5'-P-CCNC: 21 U/L (ref 0–45)
BILIRUB SERPL-MCNC: 0.4 MG/DL (ref 0.2–1.3)
BUN SERPL-MCNC: 16 MG/DL (ref 7–30)
CALCIUM SERPL-MCNC: 8.8 MG/DL (ref 8.5–10.1)
CHLORIDE SERPL-SCNC: 107 MMOL/L (ref 94–109)
CHOLEST SERPL-MCNC: 158 MG/DL
CO2 SERPL-SCNC: 28 MMOL/L (ref 20–32)
CREAT SERPL-MCNC: 0.53 MG/DL (ref 0.52–1.04)
DEPRECATED CALCIDIOL+CALCIFEROL SERPL-MC: 39 UG/L (ref 20–75)
ERYTHROCYTE [DISTWIDTH] IN BLOOD BY AUTOMATED COUNT: 11.6 % (ref 10–15)
GFR SERPL CREATININE-BSD FRML MDRD: >90 ML/MIN/1.7M2
GLUCOSE SERPL-MCNC: 83 MG/DL (ref 70–99)
HCT VFR BLD AUTO: 41.3 % (ref 35–47)
HDLC SERPL-MCNC: 113 MG/DL
HGB BLD-MCNC: 13.6 G/DL (ref 11.7–15.7)
LDLC SERPL CALC-MCNC: 36 MG/DL
MCH RBC QN AUTO: 32.3 PG (ref 26.5–33)
MCHC RBC AUTO-ENTMCNC: 32.9 G/DL (ref 31.5–36.5)
MCV RBC AUTO: 98 FL (ref 78–100)
NONHDLC SERPL-MCNC: 45 MG/DL
PLATELET # BLD AUTO: 247 10E9/L (ref 150–450)
POTASSIUM SERPL-SCNC: 4.1 MMOL/L (ref 3.4–5.3)
PROT SERPL-MCNC: 7.6 G/DL (ref 6.8–8.8)
RBC # BLD AUTO: 4.21 10E12/L (ref 3.8–5.2)
SODIUM SERPL-SCNC: 141 MMOL/L (ref 133–144)
TRIGL SERPL-MCNC: 46 MG/DL
TSH SERPL DL<=0.005 MIU/L-ACNC: 0.81 MU/L (ref 0.4–4)
WBC # BLD AUTO: 7.8 10E9/L (ref 4–11)

## 2018-08-17 PROCEDURE — 80061 LIPID PANEL: CPT | Performed by: NURSE PRACTITIONER

## 2018-08-17 PROCEDURE — 84443 ASSAY THYROID STIM HORMONE: CPT | Performed by: NURSE PRACTITIONER

## 2018-08-17 PROCEDURE — 36415 COLL VENOUS BLD VENIPUNCTURE: CPT | Performed by: NURSE PRACTITIONER

## 2018-08-17 PROCEDURE — 80053 COMPREHEN METABOLIC PANEL: CPT | Performed by: NURSE PRACTITIONER

## 2018-08-17 PROCEDURE — 82306 VITAMIN D 25 HYDROXY: CPT | Performed by: NURSE PRACTITIONER

## 2018-08-17 PROCEDURE — 99214 OFFICE O/P EST MOD 30 MIN: CPT | Performed by: NURSE PRACTITIONER

## 2018-08-17 PROCEDURE — 85027 COMPLETE CBC AUTOMATED: CPT | Performed by: NURSE PRACTITIONER

## 2018-08-17 RX ORDER — HYDROXYZINE HYDROCHLORIDE 25 MG/1
25-50 TABLET, FILM COATED ORAL EVERY 6 HOURS PRN
Qty: 30 TABLET | Refills: 1 | Status: SHIPPED | OUTPATIENT
Start: 2018-08-17 | End: 2020-04-03

## 2018-08-17 RX ORDER — TRIAMCINOLONE ACETONIDE 1 MG/G
CREAM TOPICAL
Qty: 30 G | Refills: 1 | Status: SHIPPED | OUTPATIENT
Start: 2018-08-17

## 2018-08-17 RX ORDER — ZOLPIDEM TARTRATE 5 MG/1
5 TABLET ORAL
Qty: 30 TABLET | Refills: 3 | Status: SHIPPED | OUTPATIENT
Start: 2018-08-17 | End: 2018-12-10

## 2018-08-17 ASSESSMENT — ANXIETY QUESTIONNAIRES
IF YOU CHECKED OFF ANY PROBLEMS ON THIS QUESTIONNAIRE, HOW DIFFICULT HAVE THESE PROBLEMS MADE IT FOR YOU TO DO YOUR WORK, TAKE CARE OF THINGS AT HOME, OR GET ALONG WITH OTHER PEOPLE: SOMEWHAT DIFFICULT
5. BEING SO RESTLESS THAT IT IS HARD TO SIT STILL: MORE THAN HALF THE DAYS
1. FEELING NERVOUS, ANXIOUS, OR ON EDGE: MORE THAN HALF THE DAYS
GAD7 TOTAL SCORE: 11
7. FEELING AFRAID AS IF SOMETHING AWFUL MIGHT HAPPEN: NOT AT ALL
3. WORRYING TOO MUCH ABOUT DIFFERENT THINGS: MORE THAN HALF THE DAYS
2. NOT BEING ABLE TO STOP OR CONTROL WORRYING: MORE THAN HALF THE DAYS
6. BECOMING EASILY ANNOYED OR IRRITABLE: SEVERAL DAYS

## 2018-08-17 ASSESSMENT — PATIENT HEALTH QUESTIONNAIRE - PHQ9: 5. POOR APPETITE OR OVEREATING: MORE THAN HALF THE DAYS

## 2018-08-17 NOTE — PATIENT INSTRUCTIONS
PLAN:   1.  Orders Placed This Encounter   Medications     triamcinolone (KENALOG) 0.1 % cream     Sig: Apply sparingly to affected area three times daily prn     Dispense:  30 g     Refill:  1     zolpidem (AMBIEN) 5 MG tablet     Sig: Take 1 tablet (5 mg) by mouth nightly as needed for sleep     Dispense:  30 tablet     Refill:  3     hydrOXYzine (ATARAX) 25 MG tablet     Sig: Take 1-2 tablets (25-50 mg) by mouth every 6 hours as needed for anxiety     Dispense:  30 tablet     Refill:  1     Orders Placed This Encounter   Procedures     Lipid panel reflex to direct LDL Fasting     Comprehensive metabolic panel     TSH with free T4 reflex     JUST IN CASE     CBC with platelets     Vitamin D Deficiency       3. Patient needs to follow up in if no improvement,or sooner if worsening of symptoms or other symptoms develop.  Will follow up and/or notify patient on results via phone or mail to determine further need for followup  Schedule physical exam     It was a pleasure seeing you today at the [facility]. Thank you for allowing us to care for you today. We truly hope we provided you with the excellent service you deserve. Please let us know if there is anything else we can do for you so we can be sure you are leaving completley satisfied with your care experience.       General information about your clinic   Clinic Hours Lab Hours (Appointments are required)   Mon-Thurs: 7:30 AM - 7 PM Mon-Thurs: 7:30 AM - 7 PM   Fri: 7:30 AM - 5 PM Fri: 7:30 AM - 5 PM        After Hours Nurse Advise & Appts:  [facility] Nurse Advisors: [phone]  [facility] On Call: to make appointments anytime: [phone] On Call Physician: call [phone] and answering service will page the on call physician.        For urgent appointments, please call [phone] and ask for the triage nurse or your care team clinic nurse.  How to contact my care team:  MyChart: [email or web address]   Phone: [phone]    Fax: [phone]       [facility]:   Phone: [phone]  Hours: 8:00 AM - 6:00 PM  Medication Refills:  Call your pharmacy and they will forward the refill to us. Please allow 3 business days for your refills to be completed.       Normal or non-critical lab and imaging results will be communicated to you by MyChart, letter or phone within 7 days.  If you do not hear from us within 10 days, please call the clinic. If you have a critical or abnormal lab result, we will notify you by phone as soon as possible.       We now have PWIC (Pediatric Walk in Care)  Monday-Friday from 7:30-4. Simply walk in and be seen for your urgent needs like cough, fever, rash, diarrhea or vomiting, pink eye, UTI. No appointments needed. Ask one of the team for more information      -Your Care Team:    Dr. Caitlyn Dumont - Internal Medicine/Pediatrics   Dr. Alex Eastman - Family Medicine  Dr. Cee Perez - Pediatrics  Dr. Jayla Vasquez - Pediatrics  Nany Call CNP - Family Practice Nurse Practitioner

## 2018-08-17 NOTE — MR AVS SNAPSHOT
After Visit Summary   8/17/2018    Radha Galvan    MRN: [id number]           Patient Information     Date Of Birth          1990        Visit Information        Provider Department      8/17/2018 9:10 AM Nany Call APRN [facility]        Today's Diagnoses     Depression with anxiety    -  1    Screening for diabetes mellitus        Screening for thyroid disorder        CARDIOVASCULAR SCREENING; LDL GOAL LESS THAN 160        Loss of weight        Eczema, unspecified type        Insomnia, unspecified type          Care Instructions    PLAN:   1.  Orders Placed This Encounter   Medications     triamcinolone (KENALOG) 0.1 % cream     Sig: Apply sparingly to affected area three times daily prn     Dispense:  30 g     Refill:  1     zolpidem (AMBIEN) 5 MG tablet     Sig: Take 1 tablet (5 mg) by mouth nightly as needed for sleep     Dispense:  30 tablet     Refill:  3     hydrOXYzine (ATARAX) 25 MG tablet     Sig: Take 1-2 tablets (25-50 mg) by mouth every 6 hours as needed for anxiety     Dispense:  30 tablet     Refill:  1     Orders Placed This Encounter   Procedures     Lipid panel reflex to direct LDL Fasting     Comprehensive metabolic panel     TSH with free T4 reflex     JUST IN CASE     CBC with platelets     Vitamin D Deficiency       3. Patient needs to follow up in if no improvement,or sooner if worsening of symptoms or other symptoms develop.  Will follow up and/or notify patient on results via phone or mail to determine further need for followup  Schedule physical exam     It was a pleasure seeing you today at the [facility]. Thank you for allowing us to care for you today. We truly hope we provided you with the excellent service you deserve. Please let us know if there is anything else we can do for you so we can be sure you are leaving completley satisfied with your care experience.       General information about your  clinic   Clinic Hours Lab Hours (Appointments are required)   Mon-Thurs: 7:30 AM - 7 PM Mon-Thurs: 7:30 AM - 7 PM   Fri: 7:30 AM - 5 PM Fri: 7:30 AM - 5 PM        After Hours Nurse Advise & Appts:  [facility] Nurse Advisors: [phone]  [facility] On Call: to make appointments anytime: [phone] On Call Physician: call [phone] and answering service will page the on call physician.        For urgent appointments, please call [phone] and ask for the triage nurse or your care team clinic nurse.  How to contact my care team:  MyChart: [email or web address]   Phone: [phone]   Fax: [phone]       [facility]:   Phone: [phone]  Hours: 8:00 AM - 6:00 PM  Medication Refills:  Call your pharmacy and they will forward the refill to us. Please allow 3 business days for your refills to be completed.       Normal or non-critical lab and imaging results will be communicated to you by MyChart, letter or phone within 7 days.  If you do not hear from us within 10 days, please call the clinic. If you have a critical or abnormal lab result, we will notify you by phone as soon as possible.       We now have PWIC (Pediatric Walk in Care)  Monday-Friday from 7:30-4. Simply walk in and be seen for your urgent needs like cough, fever, rash, diarrhea or vomiting, pink eye, UTI. No appointments needed. Ask one of the team for more information      -Your Care Team:    Dr. Caitlyn Dumont - Internal Medicine/Pediatrics   Dr. Alex Eastman - Family Medicine  Dr. Cee Perez - Pediatrics  Dr. Jayla Vasquez - Pediatrics  Nany Call CNP - Family Practice Nurse Practitioner                           Follow-ups after your visit        Who to contact     If you have questions or need follow up information about today's clinic visit or your schedule please contact [facility] directly at [phone].  Normal or non-critical lab and imaging results will be communicated to you by MyChart, letter or  "phone within 4 business days after the clinic has received the results. If you do not hear from us within 7 days, please contact the clinic through [employer] or phone. If you have a critical or abnormal lab result, we will notify you by phone as soon as possible.  Submit refill requests through [employer] or call your pharmacy and they will forward the refill request to us. Please allow 3 business days for your refill to be completed.          Additional Information About Your Visit        [employer] Information     [employer] is an electronic gateway that provides easy, online access to your medical records. With [employer], you can request a clinic appointment, read your test results, renew a prescription or communicate with your care team.     To sign up for [employer] visit the website at [email or web address]   You will be asked to enter the access code listed below, as well as some personal information. Please follow the directions to create your username and password.     Your access code is: [email or web address]  Expires: 2018  9:35 AM     Your access code will  in 90 days. If you need help or a new code, please contact your [facility] or call [phone] for assistance.        Care EveryWhere ID     This is your Care EveryWhere ID. This could be used by other organizations to access your [facility] medical records  [id number]        Your Vitals Were     Pulse Temperature Height Last Period Pulse Oximetry BMI (Body Mass Index)    78 98.2  F (36.8  C) (Temporal) 5' 2\" (1.575 m) 2018 96% 18.18 kg/m2       Blood Pressure from Last 3 Encounters:   18 102/66   17 99/55   16 100/70    Weight from Last 3 Encounters:   18 99 lb 6.4 oz (45.1 kg)   17 107 lb 11.2 oz (48.9 kg)   16 103 lb 8 oz (46.9 kg)              We Performed the Following     CBC with platelets     Comprehensive metabolic panel     JUST IN CASE     Lipid panel reflex to direct LDL " Fasting     TSH with free T4 reflex     Vitamin D Deficiency          Today's Medication Changes          These changes are accurate as of 8/17/18  9:57 AM.  If you have any questions, ask your nurse or doctor.               Start taking these medicines.        Dose/Directions    hydrOXYzine 25 MG tablet   Commonly known as:  ATARAX   Used for:  Depression with anxiety   Started by:  Nany Call APRN CNP        Dose:  25-50 mg   Take 1-2 tablets (25-50 mg) by mouth every 6 hours as needed for anxiety   Quantity:  30 tablet   Refills:  1       triamcinolone 0.1 % cream   Commonly known as:  KENALOG   Used for:  Eczema, unspecified type   Started by:  Nany Call APRN CNP        Apply sparingly to affected area three times daily prn   Quantity:  30 g   Refills:  1         These medicines have changed or have updated prescriptions.        Dose/Directions    zolpidem 5 MG tablet   Commonly known as:  AMBIEN   This may have changed:  See the new instructions.   Used for:  Insomnia, unspecified type   Changed by:  Nany Call APRN CNP        Dose:  5 mg   Take 1 tablet (5 mg) by mouth nightly as needed for sleep   Quantity:  30 tablet   Refills:  3            Where to get your medicines      These medications were sent to [facility] [address] [place] [id number]     Phone:  [phone]     hydrOXYzine 25 MG tablet    triamcinolone 0.1 % cream         Some of these will need a paper prescription and others can be bought over the counter.  Ask your nurse if you have questions.     Bring a paper prescription for each of these medications     zolpidem 5 MG tablet                Primary Care Provider Office Phone # Fax #    YVES Villarreal [phone]       [address]  [place] [address]        Equal Access to Services     PRECIOUS NUNEZ AH: rose marie Cole  randy susiesheridan blackmanshadia key ah. So [facility] [phone].    ATENCIÓN: Si annette swan, tiene a velasco disposición servicios gratuitos de asistencia lingüística. Taya al [phone].    We comply with applicable federal civil rights laws and [place] laws. We do not discriminate on the basis of race, color, national origin, age, disability, sex, sexual orientation, or gender identity.            Thank you!     Thank you for choosing [facility]  for your care. Our goal is always to provide you with excellent care. Hearing back from our patients is one way we can continue to improve our services. Please take a few minutes to complete the written survey that you may receive in the mail after your visit with us. Thank you!             Your Updated Medication List - Protect others around you: Learn how to safely use, store and throw away your medicines at www.disposemymeds.org.          This list is accurate as of 8/17/18  9:57 AM.  Always use your most recent med list.                   Brand Name Dispense Instructions for use Diagnosis    hydrOXYzine 25 MG tablet    ATARAX    30 tablet    Take 1-2 tablets (25-50 mg) by mouth every 6 hours as needed for anxiety    Depression with anxiety       OMEGA-3 FISH OIL PO           PROAIR  (90 Base) MCG/ACT inhaler   Generic drug:  albuterol      Inhale 1 puff into the lungs daily        triamcinolone 0.1 % cream    KENALOG    30 g    Apply sparingly to affected area three times daily prn    Eczema, unspecified type       zolpidem 5 MG tablet    AMBIEN    30 tablet    Take 1 tablet (5 mg) by mouth nightly as needed for sleep    Insomnia, unspecified type

## 2018-08-17 NOTE — PROGRESS NOTES
SUBJECTIVE:   Radha Galvan is a 28 year old female who presents to clinic today for the following health issues:      Depression and Anxiety Follow-Up    Status since last visit: Anxiety is still the same patient is a full time student and full time worker    Other associated symptoms:None    Complicating factors:     Significant life event: No     Current substance abuse: None    PHQ-9 11/7/2016 3/2/2017 5/1/2017   Total Score 10 10 7   Q9: Suicide Ideation Not at all Not at all Not at all     AZIZA-7 SCORE 11/7/2016 3/2/2017 5/1/2017   Total Score 8 7 6   has not been in counseling in the past but nothing in about a year  Has not been on any medication for a long time  Works out and takes many yoga classes   Does not want to be on any medications   No recreational drugs and no alcohol excess  Does use the Ambien at bedtime periodically for sleep and does need a refill on this   Is having some anxiety related to graduation and what to do now that is graduating   States like is feeling OK and not sure if wanting to go back to counselor at this point      In the past two weeks have you had thoughts of suicide or self-harm?  No.    Do you have concerns about your personal safety or the safety of others?   No  PHQ-9  English  PHQ-9   Any Language  AZIZA-7  Suicide Assessment Five-step Evaluation and Treatment (SAFE-T)    Amount of exercise or physical activity: 4-5 days/week for an average of 45-60 minutes    Problems taking medications regularly: No    Medication side effects: none    Diet: carbohydrate counting    Problem list and histories reviewed & adjusted, as indicated.  Additional history: as documented    Patient Active Problem List   Diagnosis     Depression with anxiety     History reviewed. No pertinent surgical history.    Social History   Substance Use Topics     Smoking status: Current Every Day Smoker     Packs/day: 0.30     Types: Cigarettes     Smokeless tobacco: Never Used     Alcohol use 0.0  "oz/week     0 Standard drinks or equivalent per week      Comment: 3-5 glasses a night     History reviewed. No pertinent family history.      Current Outpatient Prescriptions   Medication Sig Dispense Refill     albuterol (ALBUTEROL) 108 (90 BASE) MCG/ACT inhaler Inhale 1 puff into the lungs daily       Omega-3 Fatty Acids (OMEGA-3 FISH OIL PO)        zolpidem (AMBIEN) 5 MG tablet TAKE 1 TABLET BY MOUTH EVERY DAY AT BEDTIME AS NEEDED 30 tablet 0     Allergies   Allergen Reactions     Hmg-Coa-R Inhibitors      \"cockroaches     Isoflavones      Mold      Ragweeds Itching     Sulfa Drugs      BP Readings from Last 3 Encounters:   08/17/18 102/66   05/01/17 99/55   11/07/16 100/70    Wt Readings from Last 3 Encounters:   08/17/18 99 lb 6.4 oz (45.1 kg)   05/01/17 107 lb 11.2 oz (48.9 kg)   11/07/16 103 lb 8 oz (46.9 kg)                  Labs reviewed in EPIC    Reviewed and updated as needed this visit by clinical staff  Tobacco  Allergies  Med Hx  Surg Hx  Fam Hx  Soc Hx      Reviewed and updated as needed this visit by Provider         ROS:  CONSTITUTIONAL:POSITIVE  for weight loss and NEGATIVE  for sweats  ENT/MOUTH: NEGATIVE for ear, mouth and throat problems  RESP:NEGATIVE for significant cough or SOB  CV: NEGATIVE for chest pain, palpitations or peripheral edema  GI: NEGATIVE for abdominal pain , nausea and vomiting  MUSCULOSKELETAL: NEGATIVE for significant arthralgias or myalgia  NEURO: NEGATIVE for weakness, dizziness or paresthesias  Skin: has some intermittent eczema and had a cream in the past   PSYCHIATRIC: POSITIVE foranxiety, Hx anxiety and Hx depression and NEGATIVE foralcohol abuse, drug usage, thoughts of hurting someone else and thoughts of self harm    OBJECTIVE:     /66 (BP Location: Right arm, Patient Position: Sitting, Cuff Size: Adult Regular)  Pulse 78  Temp 98.2  F (36.8  C) (Temporal)  Ht 5' 2\" (1.575 m)  Wt 99 lb 6.4 oz (45.1 kg)  LMP 08/13/2018  SpO2 96%  BMI 18.18 " kg/m2  Body mass index is 18.18 kg/(m^2).   Wt Readings from Last 4 Encounters:   08/17/18 99 lb 6.4 oz (45.1 kg)   05/01/17 107 lb 11.2 oz (48.9 kg)   11/07/16 103 lb 8 oz (46.9 kg)   10/31/16 100 lb 3.2 oz (45.5 kg)     GENERAL APPEARANCE: alert, active and no distress  NECK: no adenopathy  RESP: lungs clear to auscultation - no rales, rhonchi or wheezes  CV: regular rates and rhythm and no murmur, click or rub  MS: extremities normal- no gross deformities noted  NEURO: Normal strength and tone, mentation intact and speech normal  PSYCH: mentation appears normal, patient appearance--, affect flat and fatigued  MENTAL STATUS EXAM:  Appearance/Behavior: No apparent distress and Dressed appropriately for weather  Speech: Normal  Mood/Affect: flat  Insight: Fair    Diagnostic Test Results:  Results for orders placed or performed in visit on 08/17/18   Lipid panel reflex to direct LDL Fasting   Result Value Ref Range    Cholesterol 158 <200 mg/dL    Triglycerides 46 <150 mg/dL    HDL Cholesterol 113 >49 mg/dL    LDL Cholesterol Calculated 36 <100 mg/dL    Non HDL Cholesterol 45 <130 mg/dL   Comprehensive metabolic panel   Result Value Ref Range    Sodium 141 133 - 144 mmol/L    Potassium 4.1 3.4 - 5.3 mmol/L    Chloride 107 94 - 109 mmol/L    Carbon Dioxide 28 20 - 32 mmol/L    Anion Gap 6 3 - 14 mmol/L    Glucose 83 70 - 99 mg/dL    Urea Nitrogen 16 7 - 30 mg/dL    Creatinine 0.53 0.52 - 1.04 mg/dL    GFR Estimate >90 >60 mL/min/1.7m2    GFR Estimate If Black >90 >60 mL/min/1.7m2    Calcium 8.8 8.5 - 10.1 mg/dL    Bilirubin Total 0.4 0.2 - 1.3 mg/dL    Albumin 4.2 3.4 - 5.0 g/dL    Protein Total 7.6 6.8 - 8.8 g/dL    Alkaline Phosphatase 74 40 - 150 U/L    ALT 23 0 - 50 U/L    AST 21 0 - 45 U/L   TSH with free T4 reflex   Result Value Ref Range    TSH 0.81 0.40 - 4.00 mU/L   CBC with platelets   Result Value Ref Range    WBC 7.8 4.0 - 11.0 10e9/L    RBC Count 4.21 3.8 - 5.2 10e12/L    Hemoglobin 13.6 11.7 - 15.7 g/dL     Hematocrit 41.3 35.0 - 47.0 %    MCV 98 78 - 100 fl    MCH 32.3 26.5 - 33.0 pg    MCHC 32.9 31.5 - 36.5 g/dL    RDW 11.6 10.0 - 15.0 %    Platelet Count 247 150 - 450 10e9/L   Vitamin D Deficiency   Result Value Ref Range    Vitamin D Deficiency screening 39 20 - 75 ug/L       ASSESSMENT/PLAN:     Radha was seen today for recheck medication.    Diagnoses and all orders for this visit:    Depression with anxiety  -     Vitamin D Deficiency  -     hydrOXYzine (ATARAX) 25 MG tablet; Take 1-2 tablets (25-50 mg) by mouth every 6 hours as needed for anxiety  Reviewed concept of depression as function of biochemical imbalance of neurotransmitters/rationale for treatment.  Risks and benefits of medication(s) reviewed with patient.  Questions answered.  Patient instructed to call for significant side effects medications or problems  Patient advised immediate presentation to hospital for suicidal thought, etc.      Screening for diabetes mellitus  -     Comprehensive metabolic panel    Screening for thyroid disorder  -     TSH with free T4 reflex    CARDIOVASCULAR SCREENING; LDL GOAL LESS THAN 160  -     Lipid panel reflex to direct LDL Fasting    Loss of weight  -     TSH with free T4 reflex  -     JUST IN CASE  -     CBC with platelets    Eczema, unspecified type  -     triamcinolone (KENALOG) 0.1 % cream; Apply sparingly to affected area three times daily prn    Insomnia, unspecified type  -     zolpidem (AMBIEN) 5 MG tablet; Take 1 tablet (5 mg) by mouth nightly as needed for sleep    PLAN:    Patient needs to follow up in if no improvement,or sooner if worsening of symptoms or other symptoms develop.  Will follow up and/or notify patient on results via phone or mail to determine further need for followup  Schedule physical exam     See Patient Instructions    YVES Villarreal CNP  [facility]

## 2018-08-17 NOTE — LETTER
August 19, 2018      Radha Galvan                                                                [address]  [place] [address]          Dear Radha,    The results of your recent   -Liver and gallbladder tests are normal. (ALT,AST, Alk phos, bilirubin), kidney function is normal (Cr, GFR), Sodium is normal, Potassium is normal, Calcium is normal, Glucose is normal (diabetes screening test).   -Cholesterol levels (LDL,HDL, Triglycerides) are normal.  ADVISE: rechecking in 1 year.  -TSH (thyroid stimulating hormone) level is normal which indicates normal thyroid function.  -Normal red blood cell (hgb) levels, normal white blood cell count and normal platelet levels.  -Vitamin D level is normal, 1000 IU daily in diet or supplements is recommended.     Results for orders placed or performed in visit on 08/17/18   Lipid panel reflex to direct LDL Fasting   Result Value Ref Range    Cholesterol 158 <200 mg/dL    Triglycerides 46 <150 mg/dL    HDL Cholesterol 113 >49 mg/dL    LDL Cholesterol Calculated 36 <100 mg/dL    Non HDL Cholesterol 45 <130 mg/dL   Comprehensive metabolic panel   Result Value Ref Range    Sodium 141 133 - 144 mmol/L    Potassium 4.1 3.4 - 5.3 mmol/L    Chloride 107 94 - 109 mmol/L    Carbon Dioxide 28 20 - 32 mmol/L    Anion Gap 6 3 - 14 mmol/L    Glucose 83 70 - 99 mg/dL    Urea Nitrogen 16 7 - 30 mg/dL    Creatinine 0.53 0.52 - 1.04 mg/dL    GFR Estimate >90 >60 mL/min/1.7m2    GFR Estimate If Black >90 >60 mL/min/1.7m2    Calcium 8.8 8.5 - 10.1 mg/dL    Bilirubin Total 0.4 0.2 - 1.3 mg/dL    Albumin 4.2 3.4 - 5.0 g/dL    Protein Total 7.6 6.8 - 8.8 g/dL    Alkaline Phosphatase 74 40 - 150 U/L    ALT 23 0 - 50 U/L    AST 21 0 - 45 U/L   TSH with free T4 reflex   Result Value Ref Range    TSH 0.81 0.40 - 4.00 mU/L   CBC with platelets   Result Value Ref Range    WBC 7.8 4.0 - 11.0 10e9/L    RBC Count 4.21 3.8 - 5.2 10e12/L    Hemoglobin 13.6 11.7 - 15.7 g/dL    Hematocrit 41.3  35.0 - 47.0 %    MCV 98 78 - 100 fl    MCH 32.3 26.5 - 33.0 pg    MCHC 32.9 31.5 - 36.5 g/dL    RDW 11.6 10.0 - 15.0 %    Platelet Count 247 150 - 450 10e9/L   Vitamin D Deficiency   Result Value Ref Range    Vitamin D Deficiency screening 39 20 - 75 ug/L       Please make a follow-up appointment if you have additional questions.    Sincerely,       Nany Call RN, CNP

## 2018-08-18 ASSESSMENT — PATIENT HEALTH QUESTIONNAIRE - PHQ9: SUM OF ALL RESPONSES TO PHQ QUESTIONS 1-9: 7

## 2018-08-18 ASSESSMENT — ANXIETY QUESTIONNAIRES: GAD7 TOTAL SCORE: 11

## 2018-08-27 ENCOUNTER — TELEPHONE (OUTPATIENT)
Dept: PEDIATRICS | Facility: CLINIC | Age: 28
End: 2018-08-27

## 2018-08-27 NOTE — TELEPHONE ENCOUNTER
8.27.18  Patient had script of Ambien go to [facility] and she is requesting it to go to [facility] in .         [id number] - [place], [place] - [address]    Please call patient back. [phone]

## 2018-08-28 NOTE — TELEPHONE ENCOUNTER
Patient advised Ambien script faxed to [facility].    Nimisha Verdugo CMA

## 2018-09-17 ENCOUNTER — TELEPHONE (OUTPATIENT)
Dept: PEDIATRICS | Facility: CLINIC | Age: 28
End: 2018-09-17

## 2018-09-17 NOTE — TELEPHONE ENCOUNTER
Pharmacy called and is requesting a call to refill zolpidem (AMBIEN) 5 MG tablet early.  The patient is going out of town.  Thank you.

## 2018-09-17 NOTE — TELEPHONE ENCOUNTER
zolpidem (AMBIEN) 5 MG tablet 30 tablet 3 8/17/2018  No      Sig - Route: Take 1 tablet (5 mg) by mouth nightly as needed for sleep - Oral         Routed to Nany Call, FRAN, APRN CNP for review and orders.  Nancy Schulz, CMA

## 2018-12-10 DIAGNOSIS — G47.00 INSOMNIA, UNSPECIFIED TYPE: ICD-10-CM

## 2018-12-11 RX ORDER — ZOLPIDEM TARTRATE 5 MG/1
TABLET ORAL
Qty: 30 TABLET | Refills: 1 | Status: SHIPPED | OUTPATIENT
Start: 2018-12-11 | End: 2019-02-07

## 2018-12-11 NOTE — TELEPHONE ENCOUNTER
Routing refill request to provider for review/approval because:  Drug not on the [facility] refill protocol      Disp Refills Start End VINICIO   zolpidem (AMBIEN) 5 MG tablet 30 tablet 3 8/17/2018  No   Sig - Route: Take 1 tablet (5 mg) by mouth nightly as needed for sleep - Oral     Last OV with RADHA Call, CNP: 8/17/18    No future apt.s    Kendra Ivan RN

## 2019-01-03 ENCOUNTER — TELEPHONE (OUTPATIENT)
Dept: PEDIATRICS | Facility: CLINIC | Age: 29
End: 2019-01-03

## 2019-01-03 NOTE — TELEPHONE ENCOUNTER
[facility] Call Center    Phone Message    May a detailed message be left on voicemail: yes    Reason for Call: Medication Refill Request    Has the patient contacted the pharmacy for the refill? Yes   Name of medication being requested: zolpidem (AMBIEN) 5 MG tablet [[id number]] (Order [id number])    Provider who prescribed the medication: Nany Call  Pharmacy: [facility]  Date medication is needed: As Soon As Possible. Requesting an early refiill. Pt lost Rx       Action Taken: Message routed to:  Primary Care p [phone]

## 2019-01-04 NOTE — TELEPHONE ENCOUNTER
Please ask patient to call the pharmacy to refill this medication.  There is 1 refill available for 30 days.    zolpidem (AMBIEN) 5 MG tablet 30 tablet 1 12/11/2018  No   Sig: TAKE 1 TABLET BY MOUTH EVERY EVENING AT BEDTIME AS NEEDED FOR SLEEP         Rupali Glover RN, [facility]

## 2019-01-04 NOTE — TELEPHONE ENCOUNTER
"Noted pharmacy calling in request stating \"patient lost Rx\". Last Rx faxed by clinic staff to pharmacy on 12/11/18 and should have refills on file at pharmacy. Per pharmacy patient last filled on 12/11/18 #30, patient informing pharmacy that she lost her medication and pharmacy is asking for verbal orders with ok to fill early due to patient report.    Routed to Nany Call NP, APRN CNP for review and orders.  Nancy REBOLLAR CMA      "

## 2019-01-07 NOTE — TELEPHONE ENCOUNTER
Called [facility] and gave verbal okay to pharmacist to fill zolpidem (AMBIEN) 5 MG tablet early due to lost prescription.    Called patient to inform of above. Patient stated understanding.  Nya Sparks CMA

## 2019-01-08 ENCOUNTER — OFFICE VISIT (OUTPATIENT)
Dept: PEDIATRICS | Facility: CLINIC | Age: 29
End: 2019-01-08
Payer: COMMERCIAL

## 2019-01-08 VITALS
HEART RATE: 84 BPM | BODY MASS INDEX: 18.22 KG/M2 | WEIGHT: 99 LBS | HEIGHT: 62 IN | DIASTOLIC BLOOD PRESSURE: 71 MMHG | TEMPERATURE: 98.5 F | OXYGEN SATURATION: 98 % | SYSTOLIC BLOOD PRESSURE: 104 MMHG

## 2019-01-08 DIAGNOSIS — F41.8 DEPRESSION WITH ANXIETY: Primary | ICD-10-CM

## 2019-01-08 DIAGNOSIS — L03.211 CELLULITIS OF FACE: ICD-10-CM

## 2019-01-08 PROCEDURE — 99214 OFFICE O/P EST MOD 30 MIN: CPT | Performed by: INTERNAL MEDICINE

## 2019-01-08 ASSESSMENT — ANXIETY QUESTIONNAIRES
1. FEELING NERVOUS, ANXIOUS, OR ON EDGE: NEARLY EVERY DAY
3. WORRYING TOO MUCH ABOUT DIFFERENT THINGS: NEARLY EVERY DAY
GAD7 TOTAL SCORE: 17
2. NOT BEING ABLE TO STOP OR CONTROL WORRYING: NEARLY EVERY DAY
5. BEING SO RESTLESS THAT IT IS HARD TO SIT STILL: NEARLY EVERY DAY
7. FEELING AFRAID AS IF SOMETHING AWFUL MIGHT HAPPEN: SEVERAL DAYS
6. BECOMING EASILY ANNOYED OR IRRITABLE: SEVERAL DAYS

## 2019-01-08 ASSESSMENT — MIFFLIN-ST. JEOR: SCORE: 1114.37

## 2019-01-08 ASSESSMENT — PATIENT HEALTH QUESTIONNAIRE - PHQ9
5. POOR APPETITE OR OVEREATING: NEARLY EVERY DAY
SUM OF ALL RESPONSES TO PHQ QUESTIONS 1-9: 15

## 2019-01-08 NOTE — PROGRESS NOTES
SUBJECTIVE:   Radha Galvan is a 28 year old female who presents to clinic today for the following health issues:      Depression and Anxiety Follow-Up    Status since last visit: Worsened/has been trying to treat naturally which hasn't helped.  Working out and meditating.    Other associated symptoms:Massive chest pains/heart pains, Patient states she had an EKG while she was at [facility] on 12/15/18 which came back normal.      Complicating factors: Increased stress levels, weight loss    Significant life event: Yes-  Patient is in college, recent break up and moved back in to parents home which has caused more stress.  Will be done with school in 90 days and things should get better.  Would like to go back on Zoloft to help her anxiety/depressionand concentration.  Patient states she used to be on 150 mg of Zoloft but that made her feel numb.  Would like to try Zoloft 50-75 mg.     Current substance abuse: None    PHQ 3/2/2017 5/1/2017 8/17/2018   PHQ-9 Total Score 10 7 7   Q9: Suicide Ideation Not at all Not at all Not at all     AZIZA-7 SCORE 3/2/2017 5/1/2017 8/17/2018   Total Score 7 6 11       PHQ-9  English  PHQ-9   Any Language  AZIZA-7  Suicide Assessment Five-step Evaluation and Treatment (SAFE-T)    Amount of exercise or physical activity: 4-5 days/week for an average of greater than 60 minutes    Problems taking medications regularly: No    Medication side effects: Yes/Hydroxyzine makes her too drowsy would like a different medication.    Diet: No diet, but trying to eat to where she doesn't vomit due to stress.        [facility] follow up on 12/15/18 for Facial Cellulitis    HPI  28-year-old young lady comes in complaining that she has lot of anxiety and depression.  She is requesting a prescription of Zoloft.  Indicates that she had problems of anxiety and depression when she was a teenager and was on Zoloft for several years.  It did work for her.  She quit about 5 years  "ago.  Recently she has been under a lot of stress.  She is going to school to be a  and has taken quite a bit of workload.  Besides that she is doing 3 part-time jobs.  She does manage to exercise 5 days a week.  Natural remedies that she has tried to help her with anxiety and depression have not been helpful.  Denies suicidal ideation.  Recently she was hospitalized with left-sided facial cellulitis.  She was in the hospital for 5 days.  She was supposed to come back for follow-up.  No fever or chills.    Problem list and histories reviewed & adjusted, as indicated.  Additional history: as documented    Current Outpatient Medications   Medication Sig Dispense Refill     albuterol (ALBUTEROL) 108 (90 BASE) MCG/ACT inhaler Inhale 1 puff into the lungs daily       hydrOXYzine (ATARAX) 25 MG tablet Take 1-2 tablets (25-50 mg) by mouth every 6 hours as needed for anxiety 30 tablet 1     sertraline (ZOLOFT) 50 MG tablet Take 1 tablet (50 mg) by mouth daily 90 tablet 1     triamcinolone (KENALOG) 0.1 % cream Apply sparingly to affected area three times daily prn 30 g 1     zolpidem (AMBIEN) 5 MG tablet TAKE 1 TABLET BY MOUTH EVERY EVENING AT BEDTIME AS NEEDED FOR SLEEP 30 tablet 1     Omega-3 Fatty Acids (OMEGA-3 FISH OIL PO)        Allergies   Allergen Reactions     Doxycycline Hives     Pt developed hives on neck after receiving ancef IV, doxycycline po and dilaudid IV     Hmg-Coa-R Inhibitors      \"cockroaches     Hydromorphone Hives     Pt developed hives on neck after receiving ancef IV, doxycyline PO, and dilaudid IV     Isoflavones      Mold      Ragweeds Itching     Sulfa Drugs        Reviewed and updated as needed this visit by clinical staff  Tobacco  Allergies  Meds  Med Hx  Surg Hx  Fam Hx  Soc Hx      Reviewed and updated as needed this visit by Provider         ROS:  Constitutional, HEENT, cardiovascular, pulmonary, GI, , musculoskeletal, neuro, skin, endocrine and psych systems are " "negative, except as otherwise noted.    OBJECTIVE:     /71 (BP Location: Right arm, Patient Position: Sitting, Cuff Size: Adult Regular)   Pulse 84   Temp 98.5  F (36.9  C) (Temporal)   Ht 1.562 m (5' 1.5\")   Wt 44.9 kg (99 lb)   SpO2 98%   BMI 18.40 kg/m    Body mass index is 18.4 kg/m .  GENERAL: healthy, alert and no distress  NECK: no adenopathy, no asymmetry, masses, or scars and thyroid normal to palpation  RESP: lungs clear to auscultation - no rales, rhonchi or wheezes  CV: regular rate and rhythm, normal S1 S2, no S3 or S4, no murmur, click or rub, no peripheral edema and peripheral pulses strong  MS: no gross musculoskeletal defects noted, no edema  Skin exam reveals no evidence of facial cellulitis.      Diagnostic Test Results:  none     ASSESSMENT/PLAN:     1.  Depression and anxiety disorder.  PHQ 9 score was 15 and cathy score 17 today.  We will start her on Zoloft 50 mg daily.  Side effects discussed.  Dose can be adjusted further depending on how she responds.  Advised to follow-up with her PCP.  2.  Recent cellulitis of left side of the face.  Clinically appears to resolved.  Denis Galan MD  [facility]  "

## 2019-01-09 ASSESSMENT — ANXIETY QUESTIONNAIRES: GAD7 TOTAL SCORE: 17

## 2019-02-07 DIAGNOSIS — G47.00 INSOMNIA, UNSPECIFIED TYPE: ICD-10-CM

## 2019-02-07 RX ORDER — ZOLPIDEM TARTRATE 5 MG/1
TABLET ORAL
Qty: 30 TABLET | Refills: 1 | Status: SHIPPED | OUTPATIENT
Start: 2019-02-07 | End: 2019-04-01

## 2019-02-07 NOTE — TELEPHONE ENCOUNTER
zolpidem (AMBIEN) 5 MG tablet      Last Written Prescription Date:  12/11/18  Last Fill Quantity: 30,   # refills: 1  Last Office Visit: 08/17/18 with PCP. 01/08/19 with Dr. Galan  Future Office visit:       Routing refill request to provider for review/approval because:  Drug not on the [facility], [facility] or [facility] refill protocol or controlled substance

## 2019-02-08 ASSESSMENT — PATIENT HEALTH QUESTIONNAIRE - PHQ9
SUM OF ALL RESPONSES TO PHQ QUESTIONS 1-9: 5
5. POOR APPETITE OR OVEREATING: SEVERAL DAYS

## 2019-02-08 ASSESSMENT — ANXIETY QUESTIONNAIRES
1. FEELING NERVOUS, ANXIOUS, OR ON EDGE: SEVERAL DAYS
IF YOU CHECKED OFF ANY PROBLEMS ON THIS QUESTIONNAIRE, HOW DIFFICULT HAVE THESE PROBLEMS MADE IT FOR YOU TO DO YOUR WORK, TAKE CARE OF THINGS AT HOME, OR GET ALONG WITH OTHER PEOPLE: NOT DIFFICULT AT ALL
6. BECOMING EASILY ANNOYED OR IRRITABLE: NOT AT ALL
3. WORRYING TOO MUCH ABOUT DIFFERENT THINGS: SEVERAL DAYS
7. FEELING AFRAID AS IF SOMETHING AWFUL MIGHT HAPPEN: NOT AT ALL
GAD7 TOTAL SCORE: 5
5. BEING SO RESTLESS THAT IT IS HARD TO SIT STILL: SEVERAL DAYS
2. NOT BEING ABLE TO STOP OR CONTROL WORRYING: SEVERAL DAYS

## 2019-02-08 NOTE — TELEPHONE ENCOUNTER
PHQ-9 SCORE 8/17/2018 1/8/2019 2/8/2019   PHQ-9 Total Score 7 15 5     AZIZA-7 SCORE 8/17/2018 1/8/2019 2/8/2019   Total Score 11 17 5       Routing back to provider for review. Previous results pulled into note weren't correct. See above. Patient PHQ9 and GAD7 results improved from last questionnaires completed 1/8/19.  Nancy REBOLLAR, CMA     Pharmacy contacted with request to present to the ED to completed medication reconciliation.

## 2019-02-08 NOTE — TELEPHONE ENCOUNTER
Noted new phq 9 results   PHQ-9 SCORE 8/17/2018 1/8/2019 2/8/2019   PHQ-9 Total Score 7 15 5       AZIZA-7 SCORE 8/17/2018 1/8/2019 2/8/2019   Total Score 11 17 5

## 2019-02-08 NOTE — TELEPHONE ENCOUNTER
Please call and have her make a follow up appointment as we need to review her plan for her depression as looks like things are not doing as well for her       [facility] Follow-up to PHQ 8/17/2018 1/8/2019 2/8/2019   PHQ-9 9. Suicide Ideation past 2 weeks Not at all Several days Not at all     PHQ-9 SCORE 5/1/2017 8/17/2018 1/8/2019   PHQ-9 Total Score 7 7 15       AZIZA-7 SCORE 8/17/2018 1/8/2019 2/8/2019   Total Score 11 17 5

## 2019-02-08 NOTE — TELEPHONE ENCOUNTER
Rx faxed to pharmacy.       Called patient on home number to inform. Patient states understanding and agrees to plan.  PHQ-9 SCORE 5/1/2017 8/17/2018 1/8/2019   PHQ-9 Total Score 7 7 15     AZIZA-7 SCORE 8/17/2018 1/8/2019 2/8/2019   Total Score 11 17 5   Patient states she's feeling much improvement in depression and anxiety symptoms.  Nancy REBOLLAR, CMA

## 2019-02-08 NOTE — TELEPHONE ENCOUNTER
PHQ-9 SCORE 5/1/2017 8/17/2018 1/8/2019   PHQ-9 Total Score 7 7 15       AZIZA-7 SCORE 5/1/2017 8/17/2018 1/8/2019   Total Score 6 11 17     Please inform patient, refill/prescriptioni approved. Please fax prescription to designated pharmacy.   Please update PHQ 9 and AZIZA  Thanks

## 2019-02-09 ASSESSMENT — ANXIETY QUESTIONNAIRES: GAD7 TOTAL SCORE: 5

## 2019-04-01 ENCOUNTER — OFFICE VISIT (OUTPATIENT)
Dept: PEDIATRICS | Facility: CLINIC | Age: 29
End: 2019-04-01
Payer: COMMERCIAL

## 2019-04-01 VITALS
BODY MASS INDEX: 19.35 KG/M2 | TEMPERATURE: 98.8 F | OXYGEN SATURATION: 98 % | SYSTOLIC BLOOD PRESSURE: 115 MMHG | HEART RATE: 80 BPM | DIASTOLIC BLOOD PRESSURE: 74 MMHG | WEIGHT: 104.1 LBS

## 2019-04-01 DIAGNOSIS — F41.8 DEPRESSION WITH ANXIETY: Primary | ICD-10-CM

## 2019-04-01 DIAGNOSIS — G47.00 INSOMNIA, UNSPECIFIED TYPE: ICD-10-CM

## 2019-04-01 DIAGNOSIS — J45.20 MILD INTERMITTENT ASTHMA WITHOUT COMPLICATION: ICD-10-CM

## 2019-04-01 DIAGNOSIS — F51.01 PRIMARY INSOMNIA: ICD-10-CM

## 2019-04-01 PROCEDURE — 99213 OFFICE O/P EST LOW 20 MIN: CPT | Performed by: INTERNAL MEDICINE

## 2019-04-01 RX ORDER — ZOLPIDEM TARTRATE 5 MG/1
TABLET ORAL
Qty: 30 TABLET | Refills: 1 | Status: SHIPPED | OUTPATIENT
Start: 2019-04-01 | End: 2019-06-10

## 2019-04-01 RX ORDER — ALBUTEROL SULFATE 90 UG/1
1 AEROSOL, METERED RESPIRATORY (INHALATION) DAILY
Qty: 8.5 G | Refills: 3 | Status: SHIPPED | OUTPATIENT
Start: 2019-04-01 | End: 2019-04-02

## 2019-04-01 ASSESSMENT — PATIENT HEALTH QUESTIONNAIRE - PHQ9
5. POOR APPETITE OR OVEREATING: SEVERAL DAYS
SUM OF ALL RESPONSES TO PHQ QUESTIONS 1-9: 5

## 2019-04-01 ASSESSMENT — PAIN SCALES - GENERAL: PAINLEVEL: NO PAIN (0)

## 2019-04-01 ASSESSMENT — ANXIETY QUESTIONNAIRES
5. BEING SO RESTLESS THAT IT IS HARD TO SIT STILL: SEVERAL DAYS
7. FEELING AFRAID AS IF SOMETHING AWFUL MIGHT HAPPEN: NOT AT ALL
3. WORRYING TOO MUCH ABOUT DIFFERENT THINGS: SEVERAL DAYS
6. BECOMING EASILY ANNOYED OR IRRITABLE: NOT AT ALL
1. FEELING NERVOUS, ANXIOUS, OR ON EDGE: MORE THAN HALF THE DAYS
GAD7 TOTAL SCORE: 6
2. NOT BEING ABLE TO STOP OR CONTROL WORRYING: SEVERAL DAYS

## 2019-04-01 NOTE — PROGRESS NOTES
SUBJECTIVE:   Radha Galvan is a 28 year old female who presents to clinic today for the following health issues:      Medication Followup of zolpidem (AMBIEN) 5 MG tablet    Taking Medication as prescribed: yes    Side Effects:  None    Medication Helping Symptoms:  yes    Medication Followup of albuterol (ALBUTEROL) 108 (90 BASE) MCG/ACT inhaler    Taking Medication as prescribed: yes-takes prn only when sick. Going on vacation and wants to have on hand if needed.    Side Effects:  None    Medication Helping Symptoms:  yes    Depression and Anxiety Follow-Up    Status since last visit: Improved     Other associated symptoms:None    Complicating factors:     Significant life event: No     Current substance abuse: None    PHQ 8/17/2018 1/8/2019 2/8/2019   PHQ-9 Total Score 7 15 5   Q9: Thoughts of better off dead/self-harm past 2 weeks Not at all Several days Not at all     AZIZA-7 SCORE 8/17/2018 1/8/2019 2/8/2019   Total Score 11 17 5       PHQ-9  English  PHQ-9   Any Language  AZIZA-7  Suicide Assessment Five-step Evaluation and Treatment (SAFE-T)    Amount of exercise or physical activity: No current routine    Problems taking medications regularly: No    Medication side effects: none    Diet: regular (no restrictions)    HPI  Patient comes in for follow-up on depression and anxiety.  She is tolerating Zoloft 50 mg very well and she like to stay with that dose.  She also has issue with primary insomnia and has been taking zolpidem 5 mg on a as needed basis as needed.  She does not use it on a daily basis.  She has tried various other remedies including over-the-counter medication, exercise, yoga, Pilates etc. and nothing is really helped.  She also has history of mild asthma and would like a refill of rescue inhaler.  She rarely has to use it.  She has not had an exacerbation of asthma since she was a teenager.  Denies chest pain or shortness of breath.        Problem list and histories reviewed & adjusted, as  "indicated.  Additional history: as documented    Patient Active Problem List   Diagnosis     Depression with anxiety     Eczema, unspecified type     Primary insomnia     Mild intermittent asthma without complication     No past surgical history on file.    Social History     Tobacco Use     Smoking status: Current Every Day Smoker     Packs/day: 0.30     Types: Cigarettes     Smokeless tobacco: Never Used   Substance Use Topics     Alcohol use: Yes     Alcohol/week: 0.0 oz     Comment: 3-5 glasses a night     No family history on file.      Current Outpatient Medications   Medication Sig Dispense Refill     albuterol (PROAIR HFA) 108 (90 Base) MCG/ACT inhaler Inhale 1 puff into the lungs daily 8.5 g 3     zolpidem (AMBIEN) 5 MG tablet TAKE 1 TABLET BY MOUTH EVERY EVENING AT BEDTIME AS NEEDED FOR SLEEP 30 tablet 1     hydrOXYzine (ATARAX) 25 MG tablet Take 1-2 tablets (25-50 mg) by mouth every 6 hours as needed for anxiety 30 tablet 1     Omega-3 Fatty Acids (OMEGA-3 FISH OIL PO)        sertraline (ZOLOFT) 50 MG tablet Take 1 tablet (50 mg) by mouth daily 90 tablet 1     triamcinolone (KENALOG) 0.1 % cream Apply sparingly to affected area three times daily prn 30 g 1     Allergies   Allergen Reactions     Doxycycline Hives     Pt developed hives on neck after receiving ancef IV, doxycycline po and dilaudid IV     Hmg-Coa-R Inhibitors      \"cockroaches     Hydromorphone Hives     Pt developed hives on neck after receiving ancef IV, doxycyline PO, and dilaudid IV     Isoflavones      Mold      Ragweeds Itching     Sulfa Drugs        Reviewed and updated as needed this visit by clinical staff       Reviewed and updated as needed this visit by Provider         ROS:  Constitutional, HEENT, cardiovascular, pulmonary, GI, , musculoskeletal, neuro, skin, endocrine and psych systems are negative, except as otherwise noted.    OBJECTIVE:     /74 (BP Location: Right arm, Patient Position: Sitting, Cuff Size: Adult " Regular)   Pulse 80   Temp 98.8  F (37.1  C) (Oral)   Wt 47.2 kg (104 lb 1.6 oz)   SpO2 98%   BMI 19.35 kg/m    Body mass index is 19.35 kg/m .  GENERAL: healthy, alert and no distress  PSYCH: mentation appears normal, affect normal/bright    Diagnostic Test Results:  none     ASSESSMENT/PLAN:     1.  Depression and anxiety under control with Zoloft 50 mg a day.  She will continue the same.  2.  Primary insomnia using zolpidem 5 mg intermittently.  30 tablets x1 refill provided.  3.  Mild asthma-stable.  Albuterol refill provided.    Denis Galan MD  [facility]

## 2019-04-02 ENCOUNTER — TELEPHONE (OUTPATIENT)
Dept: PEDIATRICS | Facility: CLINIC | Age: 29
End: 2019-04-02

## 2019-04-02 DIAGNOSIS — J45.20 MILD INTERMITTENT ASTHMA WITHOUT COMPLICATION: Primary | ICD-10-CM

## 2019-04-02 RX ORDER — ALBUTEROL SULFATE 90 UG/1
2 AEROSOL, METERED RESPIRATORY (INHALATION) EVERY 6 HOURS
Qty: 8.5 G | Refills: 3 | Status: SHIPPED | OUTPATIENT
Start: 2019-04-02

## 2019-04-02 ASSESSMENT — ANXIETY QUESTIONNAIRES: GAD7 TOTAL SCORE: 6

## 2019-04-02 NOTE — TELEPHONE ENCOUNTER
[facility] Call Center    Phone Message    May a detailed message be left on voicemail: yes    Reason for Call: Other: pt is calling requesting a call back regarding her prescription for her inhaler for pro-air, pt states her insurance does not cover and will need a different script, Pt is leaving town and needs a new script sent over today. Please give a call back to discuss    Action Taken: Message routed to:  Primary Care p [phone]

## 2019-04-02 NOTE — TELEPHONE ENCOUNTER
[facility] Call Center    Phone Message    May a detailed message be left on voicemail: yes    Reason for Call: Medication Question or concern regarding medication   Prescription Clarification  Name of Medication: albuterol (PROAIR HFA) 108 (90 Base) MCG/ACT inhaler [4595] (Order [id number])     Prescribing Provider: Dr. Galan   Pharmacy: [facility]    What on the order needs clarification? Pharmacy states for insurance purposes they are wondering if this inhaler can be switched to ventolin instead of proair.   Pt is going on vacation tomorrow morning and they are looking for this to be changed ASAP. Please call pharmacy at [phone] with any questions.           Action Taken: Message routed to:  Primary Care p [phone]

## 2019-06-10 DIAGNOSIS — G47.00 INSOMNIA, UNSPECIFIED TYPE: ICD-10-CM

## 2019-06-10 NOTE — TELEPHONE ENCOUNTER
Reason for call:  Medication   If this is a refill request, has the caller requested the refill from the pharmacy already? Yes  Will the patient be using a [facility]? No  Name of the pharmacy and phone number for the current request: [facility] [id number] [place] [facility], [place] - [id number] [place] [address] [place]    Name of the medication requested: zolpidem    Other request: Patient has none left on hand.    Phone number to reach patient:  Cell number on file:    Telephone Information:   Mobile [phone]       Best Time:  any    Can we leave a detailed message on this number?  YES     Namrata GARCIA  Central Scheduler

## 2019-06-11 RX ORDER — ZOLPIDEM TARTRATE 5 MG/1
TABLET ORAL
Qty: 30 TABLET | Refills: 1 | Status: SHIPPED | OUTPATIENT
Start: 2019-06-11 | End: 2019-08-06

## 2019-06-11 NOTE — TELEPHONE ENCOUNTER
Pending Prescriptions:                       Disp   Refills    zolpidem (AMBIEN) 5 MG tablet [Pharmacy M*30 tab*1            Sig: TAKE 1 TABLET BY MOUTH EVERY EVENING AS NEEDED           FOR SLEEP      Last Written Prescription Date:  4/1/19  Last Fill Quantity: 30,  # refills: 1   Last office visit: 4/1/2019 with prescribing provider:  Dr. Denis Galan  Future Office Visit:      Routing refill request to provider for review/approval because:  Drug not on the [facility] or [facility] refill protocol or controlled substance

## 2019-06-11 NOTE — TELEPHONE ENCOUNTER
Rx faxed to pharmacy. No need to contact patient, refill came from pharmacy.  Nancy REBOLLAR, CMA

## 2019-08-06 DIAGNOSIS — G47.00 INSOMNIA, UNSPECIFIED TYPE: ICD-10-CM

## 2019-08-06 RX ORDER — ZOLPIDEM TARTRATE 5 MG/1
TABLET ORAL
Qty: 30 TABLET | Refills: 1 | Status: SHIPPED | OUTPATIENT
Start: 2019-08-06 | End: 2019-10-14

## 2019-08-06 NOTE — TELEPHONE ENCOUNTER
zolpidem (AMBIEN) 5 MG tablet  Last Written Prescription Date:  6/11/19  Last Fill Quantity: 30,  # refills: 1   Last office visit: 4/1/2019 with prescribing provider:     Future Office Visit:      Routing refill request to provider for review/approval because:  Drug not on the [facility] refill protocol     Lianet Melendez RN, BSN, PHN  [facility]

## 2019-12-03 ENCOUNTER — TELEPHONE (OUTPATIENT)
Dept: PEDIATRICS | Facility: CLINIC | Age: 29
End: 2019-12-03

## 2019-12-03 DIAGNOSIS — G47.00 INSOMNIA, UNSPECIFIED TYPE: ICD-10-CM

## 2019-12-03 NOTE — TELEPHONE ENCOUNTER
Ask for pharmacist. Pt states she lost her ambien prescription and its too soon to fill w pharmacy right now. Need permission to fill early.

## 2019-12-05 RX ORDER — ZOLPIDEM TARTRATE 5 MG/1
TABLET ORAL
Qty: 30 TABLET | Refills: 0 | Status: SHIPPED | OUTPATIENT
Start: 2019-12-05 | End: 2020-02-04

## 2019-12-05 NOTE — TELEPHONE ENCOUNTER
Please call pharmacy OK to fill       Fill Date ID Written Drug Qty Days Prescriber Rx # Pharmacy Refill Daily Dose * Pymt Type    11/11/2019  1   10/14/2019  Zolpidem Tartrate 5 Mg Tablet  30.00 30 Sa Kup  [id number]  [facility] ([id number])  1/2 0.25 [facility] [place]   10/14/2019  1   10/14/2019  Zolpidem Tartrate 5 Mg Tablet  30.00 30 Sa Kup  [id number]  [facility] ([id number])  0/2 0.25 [facility] [place]   09/10/2019  1   08/06/2019  Zolpidem Tartrate 5 Mg Tablet  30.00 30 L Rn  [id number]  [facility] ([id number])  1/1 0.25 [facility] [place]   08/07/2019  1   08/06/2019  Zolpidem Tartrate 5 Mg Tablet  30.00 30 L Rn  [id number]  [facility] ([id number])  0/1 0.25 [facility]   07/11/2019  1   06/11/2019  Zolpidem Tartrate 5 Mg Tablet  30.00 30 L Rn  [id number]  [facility] ([id number])  1/1 0.25 [facility] [place]   06/11/2019  1   06/11/2019  Zolpidem Tartrate 5 Mg Tablet  30.00 30 L Rn  [id number]  [facility] ([id number])  0/1 0.25 [facility] [place]   05/02/2019  1   04/01/2019  Zolpidem Tartrate 5 Mg Tablet  30.00 30 As Pat  [id number]  [facility] ([id number])  1/1 0.25 [facility] [place]

## 2019-12-05 NOTE — TELEPHONE ENCOUNTER
Message routed to provider to review if okay to fill early please advise. Pt lost her prescription and is asking for refill? Liz DIAZCMA

## 2019-12-05 NOTE — TELEPHONE ENCOUNTER
[facility] Call Center    Phone Message    May a detailed message be left on voicemail: yes    Reason for Call: Patient called wanting to know if this was sent to the pharmacy since she called a few days ago. Please advise. Thank you.    Action Taken: Message routed to:  Primary Care p [phone]

## 2020-01-14 ENCOUNTER — ANCILLARY PROCEDURE (OUTPATIENT)
Dept: GENERAL RADIOLOGY | Facility: CLINIC | Age: 30
End: 2020-01-14
Attending: INTERNAL MEDICINE
Payer: COMMERCIAL

## 2020-01-14 ENCOUNTER — OFFICE VISIT (OUTPATIENT)
Dept: PEDIATRICS | Facility: CLINIC | Age: 30
End: 2020-01-14
Payer: COMMERCIAL

## 2020-01-14 ENCOUNTER — ANCILLARY PROCEDURE (OUTPATIENT)
Dept: MRI IMAGING | Facility: CLINIC | Age: 30
End: 2020-01-14
Attending: INTERNAL MEDICINE
Payer: COMMERCIAL

## 2020-01-14 VITALS
DIASTOLIC BLOOD PRESSURE: 70 MMHG | TEMPERATURE: 97.9 F | HEART RATE: 81 BPM | BODY MASS INDEX: 20.08 KG/M2 | SYSTOLIC BLOOD PRESSURE: 105 MMHG | WEIGHT: 108 LBS | OXYGEN SATURATION: 98 %

## 2020-01-14 DIAGNOSIS — S59.901A INJURY OF RIGHT ELBOW REGION: ICD-10-CM

## 2020-01-14 DIAGNOSIS — S89.91XA KNEE INJURY, RIGHT, INITIAL ENCOUNTER: Primary | ICD-10-CM

## 2020-01-14 DIAGNOSIS — M77.01 GOLFERS ELBOW OF RIGHT UPPER EXTREMITY: ICD-10-CM

## 2020-01-14 DIAGNOSIS — F41.8 DEPRESSION WITH ANXIETY: ICD-10-CM

## 2020-01-14 DIAGNOSIS — S89.91XA KNEE INJURY, RIGHT, INITIAL ENCOUNTER: ICD-10-CM

## 2020-01-14 PROCEDURE — 73562 X-RAY EXAM OF KNEE 3: CPT | Mod: RT | Performed by: RADIOLOGY

## 2020-01-14 PROCEDURE — 73070 X-RAY EXAM OF ELBOW: CPT | Mod: RT | Performed by: RADIOLOGY

## 2020-01-14 PROCEDURE — 73721 MRI JNT OF LWR EXTRE W/O DYE: CPT | Mod: RT | Performed by: RADIOLOGY

## 2020-01-14 PROCEDURE — 99214 OFFICE O/P EST MOD 30 MIN: CPT | Performed by: INTERNAL MEDICINE

## 2020-01-14 ASSESSMENT — ANXIETY QUESTIONNAIRES
GAD7 TOTAL SCORE: 10
7. FEELING AFRAID AS IF SOMETHING AWFUL MIGHT HAPPEN: SEVERAL DAYS
5. BEING SO RESTLESS THAT IT IS HARD TO SIT STILL: SEVERAL DAYS
1. FEELING NERVOUS, ANXIOUS, OR ON EDGE: MORE THAN HALF THE DAYS
2. NOT BEING ABLE TO STOP OR CONTROL WORRYING: MORE THAN HALF THE DAYS
3. WORRYING TOO MUCH ABOUT DIFFERENT THINGS: MORE THAN HALF THE DAYS
6. BECOMING EASILY ANNOYED OR IRRITABLE: SEVERAL DAYS

## 2020-01-14 ASSESSMENT — PATIENT HEALTH QUESTIONNAIRE - PHQ9
5. POOR APPETITE OR OVEREATING: SEVERAL DAYS
SUM OF ALL RESPONSES TO PHQ QUESTIONS 1-9: 7

## 2020-01-14 NOTE — PATIENT INSTRUCTIONS
Make appointment(s) for:   -- physical therapy  -- knee MRI  -- follow up with Zeenat in 2 weeks         Increase Sertraline to 100 mg daily.

## 2020-01-14 NOTE — PROGRESS NOTES
"Subjective     Radha Galvan is a 29 year old female who presents to clinic today for the following health issues:    HPI   Musculoskeletal problem/pain, As I roomed pt, I asked the depression screening questions, she stated she has random thoughts of hurting herself. She is completing the Phq9.       Duration: Early Nov 2019    Description  Location: Right elbow, wrist and knee    Intensity:  mild, moderate    Accompanying signs and symptoms: swelling on right knee    History  Previous similar problem: no   Previous evaluation:  none    Precipitating or alleviating factors:  Trauma or overuse: YES Fall  Aggravating factors include: cold or damp weather    Therapies tried and outcome: ice, acetaminophen and Ibuprofen      Depression/AZIZA:  \"Maybe I should just end my life\"    Sertraline has been the only one that works.   In the past had tried \"everything\", either didn't work or had side effects.   Our record showed Lexapro in 2016. Pt reported it made her more depressed.     ROS:  Constitutional, HEENT, cardiovascular, pulmonary, gi and gu systems are negative, except as otherwise noted.       albuterol (PROAIR HFA/PROVENTIL HFA/VENTOLIN HFA) 108 (90 Base) MCG/ACT inhaler, Inhale 2 puffs into the lungs every 6 hours  hydrOXYzine (ATARAX) 25 MG tablet, Take 1-2 tablets (25-50 mg) by mouth every 6 hours as needed for anxiety  sertraline (ZOLOFT) 50 MG tablet, Take 2 tablets (100 mg) by mouth daily  triamcinolone (KENALOG) 0.1 % cream, Apply sparingly to affected area three times daily prn  zolpidem (AMBIEN) 5 MG tablet, TAKE 1 TABLET BY MOUTH AT BEDTIME AS NEEDED FOR SLEEP  Omega-3 Fatty Acids (OMEGA-3 FISH OIL PO),     No current facility-administered medications on file prior to visit.          Patient Active Problem List   Diagnosis     Depression with anxiety     Eczema, unspecified type     Primary insomnia     Mild intermittent asthma without complication     History reviewed. No pertinent surgical history.  "   Social History     Tobacco Use     Smoking status: Current Every Day Smoker     Packs/day: 0.30     Types: Cigarettes     Smokeless tobacco: Never Used   Substance Use Topics     Alcohol use: Yes     Alcohol/week: 0.0 standard drinks     Comment: 3-5 glasses a night     History reviewed. No pertinent family history.          Problem list, Medication list, Allergies, and Medical/Social/Surgical histories reviewed in EPIC and updated as appropriate.    OBJECTIVE:                                                    /70   Pulse 81   Temp 97.9  F (36.6  C) (Temporal)   Wt 49 kg (108 lb)   SpO2 98%   BMI 20.08 kg/m      GENERAL: healthy, alert and no distress  Right knee: moderate tenderness to palpation to inferior patella tendon. No jointline tenderness. There is some swelling over the anterior knee. No bruising. Flexion/extension intact but painful with hyperflexion.   Right elbow: tender at medial epicondyl. Normal flexion/extension. No bruising seen.     PHQ-9 SCORE 2/8/2019 4/1/2019 1/14/2020   PHQ-9 Total Score 5 5 7     AZIZA-7 SCORE 2/8/2019 4/1/2019 1/14/2020   Total Score 5 6 10          Diagnostic test results:  Results for orders placed or performed in visit on 01/14/20   MR Knee Right w/o Contrast     Status: None    Narrative    MR right knee without contrast 1/15/2020 7:59 AM    Techniques: Multiplanar multisequence imaging of the right knee was  obtained without administration of intra-articular or intravenous  contrast using routing protocol.    History: Knee pain, xray internal derangement; Knee injury, right,  initial encounter    Additional History from EMR: None    Comparison: Radiograph 1/14/2020.    Findings:    MENISCI:  Medial meniscus: Intact.  Lateral meniscus: Intact.    LIGAMENTS  Cruciate ligaments: Intact.  Medial supporting structures: Intact.  Lateral supporting structures: Intact.    EXTENSOR MECHANISM  Intact. No patellar subluxation. Patella delmy with IS ratio 1.35.  Normal  TT-TG measurement.    FLUID  Slight joint effusion. No Baker's cyst.    OSSEOUS and ARTICULAR STRUCTURES  Bones: No fracture, contusion, or osseous lesion is seen.    Patellofemoral compartment: Smooth articular cartilage with signal  heterogeneity of the midline patellar ridge and central trochlear  groove articular cartilage.    Medial compartment: No significant chondromalacia.    Lateral compartment: No significant chondromalacia.    ANCILLARY FINDINGS  Edema in the lateral aspect of the distal prefemoral fat pad (series 4  image 8).    Minimal subcutaneous edema overlying the tibial tubercle.      Impression    Impression:    1. Imaging findings suggestive of prefemoral fat pad impingement.  Correlate clinically.    2. Mild patella delmy.    3. Mild patellofemoral chondromalacia.    4. Intact cruciates and collateral ligaments. Intact menisci.    I have personally reviewed the examination and initial interpretation  and I agree with the findings.    XIOMARA (Cece MERCER MD   Results for orders placed or performed in visit on 01/14/20   XR Knee Right 3 Views     Status: None    Narrative    EXAMINATION: XR KNEE RT 3 VW  1/14/2020 9:30 AM     CLINICAL HISTORY:  Knee injury, right, initial encounter     COMPARISON: None available    FINDINGS: AP, lateral and axial views of the right knee were obtained.  There is no comparison available. The joint spaces are preserved.  There is a small joint effusion. No evidence of acute osseous  abnormalities, the patella is well seated.      Impression    IMPRESSION: No evidence of acute osseous abnormalities. Small joint  effusion. Preserved joint spaces.    JUTTA ELLERMANN, MD   Results for orders placed or performed in visit on 01/14/20   XR Elbow Right 2 Views     Status: None    Narrative    EXAMINATION: XR ELBOW RT 2 VW  1/14/2020 9:30 AM     CLINICAL HISTORY:  Injury of right elbow region     COMPARISON: None available    FINDINGS: PA and lateral radiographs of the  right elbow were obtained.  There is no comparison available. There is no significant joint  effusion. The radial head appears unremarkable. The coronoid process  is preserved.    No definite evidence of acute osseous abnormalities, preserved joint  spaces.      Impression    IMPRESSION: No evidence of acute osseous abnormalities, preserved  joint spaces.    JUTTA ELLERMANN, MD         ASSESSMENT/PLAN:                                                      29 year old female with the following diagnoses and treatment plan:      ICD-10-CM    1. Knee injury, right, initial encounter S89.91XA XR Knee Right 3 Views     MR Knee Right w/o Contrast   2. Injury of right elbow region S59.901A XR Elbow Right 2 Views   3. Golfers elbow of right upper extremity M77.01 [facility]   4. Depression with anxiety F41.8 sertraline (ZOLOFT) 50 MG tablet       -- right knee pain: xray unremarkable. Will obtain knee MRI. Likely to need to see sports medicine for consideration of knee injection.   -- right elbow: location consistent with Golder's elbow. Will have her do physical therapy  -- depression with anxiety: has suicidal thoughts. Pt denied intention. She reported failed many other meds. Sertraline works the best. I can find only Lexapro being tried. For now, will increase Sertraline and have her follow up with PCP in 2 weeks.     Will call or return to clinic if worsening or symptoms not improving as discussed.  See Patient Instructions.      Caitlyn Dumont MD-PhD  [facility]    (Note: Chart documentation was done in part with Dragon Voice Recognition software. Although reviewed after completion, some word and grammatical errors may remain.)

## 2020-01-14 NOTE — RESULT ENCOUNTER NOTE
Please call patient: Let her know that both elbow and knee x-ray showed the bones are fine.  Kidney MRI to look at meniscus and soft tissue.  See physical therapy for right elbow pain as planned.     Caitlyn Dumont MD PhD

## 2020-01-15 ASSESSMENT — ANXIETY QUESTIONNAIRES: GAD7 TOTAL SCORE: 10

## 2020-01-17 ENCOUNTER — TELEPHONE (OUTPATIENT)
Dept: PEDIATRICS | Facility: CLINIC | Age: 30
End: 2020-01-17

## 2020-01-17 DIAGNOSIS — S89.91XA KNEE INJURY, RIGHT, INITIAL ENCOUNTER: Primary | ICD-10-CM

## 2020-01-17 DIAGNOSIS — M25.561 RIGHT KNEE PAIN, UNSPECIFIED CHRONICITY: ICD-10-CM

## 2020-01-17 NOTE — TELEPHONE ENCOUNTER
Patient calls back, relayed message and Patient verbalized understanding. Warm transferred to our dedicated scheduling line.  Makayla Santiago RN

## 2020-01-17 NOTE — TELEPHONE ENCOUNTER
Called patient, left message with RN phone number to call back.       Rupali Glover RN, [facility]

## 2020-01-17 NOTE — TELEPHONE ENCOUNTER
Recent Results (from the past 744 hour(s))   XR Elbow Right 2 Views    Narrative    EXAMINATION: XR ELBOW RT 2 VW  1/14/2020 9:30 AM     CLINICAL HISTORY:  Injury of right elbow region     COMPARISON: None available    FINDINGS: PA and lateral radiographs of the right elbow were obtained.  There is no comparison available. There is no significant joint  effusion. The radial head appears unremarkable. The coronoid process  is preserved.    No definite evidence of acute osseous abnormalities, preserved joint  spaces.      Impression    IMPRESSION: No evidence of acute osseous abnormalities, preserved  joint spaces.    JUTTA ELLERMANN, MD   XR Knee Right 3 Views    Narrative    EXAMINATION: XR KNEE RT 3 VW  1/14/2020 9:30 AM     CLINICAL HISTORY:  Knee injury, right, initial encounter     COMPARISON: None available    FINDINGS: AP, lateral and axial views of the right knee were obtained.  There is no comparison available. The joint spaces are preserved.  There is a small joint effusion. No evidence of acute osseous  abnormalities, the patella is well seated.      Impression    IMPRESSION: No evidence of acute osseous abnormalities. Small joint  effusion. Preserved joint spaces.    JUTTA ELLERMANN, MD   MR Knee Right w/o Contrast    Narrative    MR right knee without contrast 1/15/2020 7:59 AM    Techniques: Multiplanar multisequence imaging of the right knee was  obtained without administration of intra-articular or intravenous  contrast using routing protocol.    History: Knee pain, xray internal derangement; Knee injury, right,  initial encounter    Additional History from EMR: None    Comparison: Radiograph 1/14/2020.    Findings:    MENISCI:  Medial meniscus: Intact.  Lateral meniscus: Intact.    LIGAMENTS  Cruciate ligaments: Intact.  Medial supporting structures: Intact.  Lateral supporting structures: Intact.    EXTENSOR MECHANISM  Intact. No patellar subluxation. Patella delmy with IS ratio 1.35.  Normal TT-TG  measurement.    FLUID  Slight joint effusion. No Baker's cyst.    OSSEOUS and ARTICULAR STRUCTURES  Bones: No fracture, contusion, or osseous lesion is seen.    Patellofemoral compartment: Smooth articular cartilage with signal  heterogeneity of the midline patellar ridge and central trochlear  groove articular cartilage.    Medial compartment: No significant chondromalacia.    Lateral compartment: No significant chondromalacia.    ANCILLARY FINDINGS  Edema in the lateral aspect of the distal prefemoral fat pad (series 4  image 8).    Minimal subcutaneous edema overlying the tibial tubercle.      Impression    Impression:    1. Imaging findings suggestive of prefemoral fat pad impingement.  Correlate clinically.    2. Mild patella delmy.    3. Mild patellofemoral chondromalacia.    4. Intact cruciates and collateral ligaments. Intact menisci.    I have personally reviewed the examination and initial interpretation  and I agree with the findings.    XIOMARA (Cece MERCER MD     Please let patient know no sign of torn ligament or mensicus   However possible inflammation of fat pad in the knee.   Ihaving her see orthopedics    Please call [phone] to make appointment  if you do not hear from referrals in the next few days.

## 2020-01-17 NOTE — TELEPHONE ENCOUNTER
Pt. Called asking about results.     Routing to provider to please review and advise on results.    Rupali Glover RN, [facility]

## 2020-01-27 ENCOUNTER — TELEPHONE (OUTPATIENT)
Dept: PEDIATRICS | Facility: CLINIC | Age: 30
End: 2020-01-27

## 2020-01-27 NOTE — TELEPHONE ENCOUNTER
----- Message from Caitlyn Dumont MD PhD sent at 1/27/2020  1:59 PM CST -----  Please call patient: looks like result was relayed to pt on 1/17/2020. She cancelled her follow up appointment with sports med and with PCP. Please contact pt to follow up with these specialist. If her knee pain is better, she still needs to see PCP for depression follow up.     Caitlyn Dumont MD PhD  
Attempt # 1    Was call answered?  No.  Left message for pt to return call to office. Liz DIAZ CMA  
Agree

## 2020-01-27 NOTE — RESULT ENCOUNTER NOTE
Please call patient: looks like result was relayed to pt on 1/17/2020. She cancelled her follow up appointment with sports med and with PCP. Please contact pt to follow up with these specialist. If her knee pain is better, she still needs to see PCP for depression follow up.     Caitlyn Dumont MD PhD

## 2020-02-04 DIAGNOSIS — G47.00 INSOMNIA, UNSPECIFIED TYPE: ICD-10-CM

## 2020-02-04 RX ORDER — ZOLPIDEM TARTRATE 5 MG/1
TABLET ORAL
Qty: 30 TABLET | Refills: 2 | Status: SHIPPED | OUTPATIENT
Start: 2020-02-04 | End: 2020-04-03

## 2020-02-04 NOTE — TELEPHONE ENCOUNTER
Health Call Center    Phone Message    May a detailed message be left on voicemail: yes     Reason for Call: Medication Refill Request    Has the patient contacted the pharmacy for the refill? Yes   Name of medication being requested: zolpidem (AMBIEN) 5 MG tablet  Provider who prescribed the medication: Zeenat Call  Pharmacy: [facility]  Date medication is needed: ASAP Patient is out of it. The writer did let her know that there is a 72 hour turn around time so it might not be filled today. Please advise. Thank you.      Action Taken: Message routed to:  Primary Care p [phone]

## 2020-02-04 NOTE — TELEPHONE ENCOUNTER
Ambien      Last Written Prescription Date:  12/5  Last Fill Quantity: 30,   # refills: 0  Last Office Visit: 1/14  Future Office visit:       Routing refill request to provider for review/approval because:  Drug not on the [facility], [facility] or [facility] refill protocol or controlled substance

## 2020-03-30 ENCOUNTER — TELEPHONE (OUTPATIENT)
Dept: PEDIATRICS | Facility: CLINIC | Age: 30
End: 2020-03-30

## 2020-03-30 DIAGNOSIS — F41.8 DEPRESSION WITH ANXIETY: ICD-10-CM

## 2020-03-31 NOTE — TELEPHONE ENCOUNTER
She needs a phone visit. I approved 30 days. Please schedule.     At her January visit, she wasn't doing very well, was to follow up in 2 weeks but this has not happened.

## 2020-03-31 NOTE — TELEPHONE ENCOUNTER
"Requested Prescriptions   Pending Prescriptions Disp Refills     sertraline (ZOLOFT) 50 MG tablet [Pharmacy Med Name: SERTRALINE HCL 50 MG TABLET] 30 tablet 5     Sig: TAKE 1 TABLET BY MOUTH EVERY DAY       SSRIs Protocol Failed - 3/30/2020  4:16 PM        Failed - PHQ-9 score less than 5 in past 6 months     Please review last PHQ-9 score.           Passed - Medication is active on med list        Passed - Patient is age 18 or older        Passed - No active pregnancy on record        Passed - No positive pregnancy test in last 12 months        Passed - Recent (6 mo) or future (30 days) visit within the authorizing provider's specialty     Patient had office visit in the last 6 months or has a visit in the next 30 days with authorizing provider or within the authorizing provider's specialty.  See \"Patient Info\" tab in inbasket, or \"Choose Columns\" in Meds & Orders section of the refill encounter.               Routing refill request to provider for review/approval because:  PHQ-9 above goal    Lianet Melendez, RN, BSN, PHN  [facility]          "

## 2020-04-03 ENCOUNTER — VIRTUAL VISIT (OUTPATIENT)
Dept: PEDIATRICS | Facility: CLINIC | Age: 30
End: 2020-04-03
Payer: COMMERCIAL

## 2020-04-03 DIAGNOSIS — G47.00 INSOMNIA, UNSPECIFIED TYPE: ICD-10-CM

## 2020-04-03 DIAGNOSIS — F41.8 DEPRESSION WITH ANXIETY: Primary | ICD-10-CM

## 2020-04-03 PROCEDURE — 99441 ZZC PHYSICIAN TELEPHONE EVALUATION 5-10 MIN: CPT | Performed by: INTERNAL MEDICINE

## 2020-04-03 RX ORDER — ZOLPIDEM TARTRATE 5 MG/1
TABLET ORAL
Qty: 30 TABLET | Refills: 2 | Status: SHIPPED | OUTPATIENT
Start: 2020-05-01 | End: 2020-07-17

## 2020-04-03 ASSESSMENT — ANXIETY QUESTIONNAIRES
2. NOT BEING ABLE TO STOP OR CONTROL WORRYING: SEVERAL DAYS
7. FEELING AFRAID AS IF SOMETHING AWFUL MIGHT HAPPEN: NOT AT ALL
GAD7 TOTAL SCORE: 3
1. FEELING NERVOUS, ANXIOUS, OR ON EDGE: SEVERAL DAYS
6. BECOMING EASILY ANNOYED OR IRRITABLE: NOT AT ALL
5. BEING SO RESTLESS THAT IT IS HARD TO SIT STILL: NOT AT ALL
3. WORRYING TOO MUCH ABOUT DIFFERENT THINGS: SEVERAL DAYS

## 2020-04-03 ASSESSMENT — PATIENT HEALTH QUESTIONNAIRE - PHQ9
5. POOR APPETITE OR OVEREATING: NOT AT ALL
SUM OF ALL RESPONSES TO PHQ QUESTIONS 1-9: 4

## 2020-04-03 NOTE — PATIENT INSTRUCTIONS
Make appointment(s) for:   -- physical in 6 months.         Medication(s) prescribed today:    Orders Placed This Encounter   Medications     zolpidem (AMBIEN) 5 MG tablet     Sig: TAKE 1 TABLET BY MOUTH EVERY DAY AT BEDTIME AS NEEDED FOR SLEEP     Dispense:  30 tablet     Refill:  2     sertraline (ZOLOFT) 50 MG tablet     Sig: Take 1 tablet (50 mg) by mouth daily     Dispense:  90 tablet     Refill:  1       `

## 2020-04-03 NOTE — PROGRESS NOTES
"Subjective     Radha Galvan is a 29 year old female who is being evaluated via a billable telephone visit.      The patient has been notified of following:     \"This telephone visit will be conducted via a call between you and your physician/provider. We have found that certain health care needs can be provided without the need for a physical exam.  This service lets us provide the care you need with a short phone conversation.  If a prescription is necessary we can send it directly to your pharmacy.  If lab work is needed we can place an order for that and you can then stop by our lab to have the test done at a later time.    If during the course of the call the physician/provider feels a telephone visit is not appropriate, you will not be charged for this service.\"     Patient has given verbal consent for Telephone visit?  Yes    Radha Galvan complains of   Chief Complaint   Patient presents with     Depression     Anxiety       ALLERGIES  Doxycycline; Hmg-coa-r inhibitors; Hydromorphone; Isoflavones; Mold; Ragweeds; and Sulfa drugs    Depression and Anxiety Follow-Up    How are you doing with your depression since your last visit? Improved     How are you doing with your anxiety since your last visit?  Improved     Are you having other symptoms that might be associated with depression or anxiety? No    Have you had a significant life event? OTHER: unemployed prior covid-19 shut down, financial concerns     Do you have any concerns with your use of alcohol or other drugs? No    Social History     Tobacco Use     Smoking status: Current Every Day Smoker     Packs/day: 0.30     Types: Cigarettes     Smokeless tobacco: Never Used   Substance Use Topics     Alcohol use: Yes     Alcohol/week: 0.0 standard drinks     Comment: 3-5 glasses a night     Drug use: Yes     PHQ 4/1/2019 1/14/2020 4/3/2020   PHQ-9 Total Score 5 7 4   Q9: Thoughts of better off dead/self-harm past 2 weeks Not at all Several days Not at " all     AZIZA-7 SCORE 4/1/2019 1/14/2020 4/3/2020   Total Score 6 10 3       Suicide Assessment Five-step Evaluation and Treatment (SAFE-T)      How many servings of fruits and vegetables do you eat daily?  2-3    On average, how many sweetened beverages do you drink each day (Examples: soda, juice, sweet tea, etc.  Do NOT count diet or artificially sweetened beverages)?   0    How many days per week do you exercise enough to make your heart beat faster? 3 or less    How many minutes a day do you exercise enough to make your heart beat faster? 30 - 60    How many days per week do you miss taking your medication? 0      She went unemployed 1 month before COVID-19 lock down. Her employer refused to pay for unemployment benefit. Pt has hired an  to assist. She feels Sertraline is helping her through this. She still takes ambien at night daily for sleep.     She does not take hydroxyzine and doesn't feel she needs it.     ROS:  Constitutional, HEENT, cardiovascular, pulmonary, gi and gu systems are negative, except as otherwise noted.       albuterol (PROAIR HFA/PROVENTIL HFA/VENTOLIN HFA) 108 (90 Base) MCG/ACT inhaler, Inhale 2 puffs into the lungs every 6 hours  triamcinolone (KENALOG) 0.1 % cream, Apply sparingly to affected area three times daily prn  Omega-3 Fatty Acids (OMEGA-3 FISH OIL PO),     No current facility-administered medications on file prior to visit.          Patient Active Problem List   Diagnosis     Depression with anxiety     Eczema, unspecified type     Primary insomnia     Mild intermittent asthma without complication     History reviewed. No pertinent surgical history.    Social History     Tobacco Use     Smoking status: Current Every Day Smoker     Packs/day: 0.30     Types: Cigarettes     Smokeless tobacco: Never Used   Substance Use Topics     Alcohol use: Yes     Alcohol/week: 0.0 standard drinks     Comment: 3-5 glasses a night     History reviewed. No pertinent family history.           Problem list, Medication list, Allergies, and Medical/Social/Surgical histories reviewed in EPIC and updated as appropriate.    OBJECTIVE:                                                    There were no vitals taken for this visit.    PSYCH: Alert and oriented times 3; speech- coherent , normal rate and volume; able to articulate logical thoughts, able to abstract reason, no tangential thoughts, no hallucinations or delusions, affect- normal       Diagnostic test results:  No results found for any visits on 04/03/20.      ASSESSMENT/PLAN:                                                      29 year old female with the following diagnoses and treatment plan:      ICD-10-CM    1. Depression with anxiety  F41.8 sertraline (ZOLOFT) 50 MG tablet   2. Insomnia, unspecified type  G47.00 zolpidem (AMBIEN) 5 MG tablet     -- depression/anxiety: well controlled. Continue sertraline 50 mg  -- insomnia: doing well with ambien. Continue  -- return in 6 months for physical.     Will call or return to clinic if worsening or symptoms not improving as discussed.  See Patient Instructions.    Phone call duration: 5 minutes.    Caitlyn Dumont MD-PhD  [facility] [place]    (Note: Chart documentation was done in part with Dragon Voice Recognition software. Although reviewed after completion, some word and grammatical errors may remain.)

## 2020-04-04 ASSESSMENT — ANXIETY QUESTIONNAIRES: GAD7 TOTAL SCORE: 3

## 2020-08-28 ENCOUNTER — TELEPHONE (OUTPATIENT)
Dept: PEDIATRICS | Facility: CLINIC | Age: 30
End: 2020-08-28

## 2020-08-28 NOTE — TELEPHONE ENCOUNTER
2nd attempt as mychart outreach message unread      Left message for patient to return clinic call regarding scheduling. Patient needs a Physical  appointment for annual exam with Caitlyn Dumont MD on or around 10/3/2020. Number to clinic and Mychart option given, please assist in scheduling once patient returns clinic call.     Call Center OKAY TO SCHEDULE.    Thanks,   Raisa Stark  Primary Care   [facility] [place]

## 2020-09-02 NOTE — TELEPHONE ENCOUNTER
3rd attempt, chart letter created and sent.    Raisa Stark  Primary Care   [facility] [place]

## 2020-10-06 DIAGNOSIS — G47.00 INSOMNIA, UNSPECIFIED TYPE: ICD-10-CM

## 2020-10-06 RX ORDER — ZOLPIDEM TARTRATE 5 MG/1
5 TABLET ORAL
Qty: 30 TABLET | Refills: 1 | Status: SHIPPED | OUTPATIENT
Start: 2020-10-06 | End: 2020-12-09

## 2020-10-06 NOTE — TELEPHONE ENCOUNTER
[facility] Call Center    Phone Message    May a detailed message be left on voicemail: yes     Reason for Call: Medication Refill Request    Has the patient contacted the pharmacy for the refill? Yes   Name of medication being requested: sertraline (ZOLOFT) 50 MG tablet and zolpidem (AMBIEN) 5 MG tablet    Provider who prescribed the medication: requesting it from [facility]  Pharmacy: [facility] [id number] [address]  Date medication is needed: 10/7/2020.  Pt is scheduled for her physical on 10/20 and is needing medication to get her through until that appointment         Action Taken: Message routed to:  Primary Care p [phone]    Travel Screening: Not Applicable

## 2020-12-06 ENCOUNTER — HEALTH MAINTENANCE LETTER (OUTPATIENT)
Age: 30
End: 2020-12-06

## 2020-12-08 DIAGNOSIS — G47.00 INSOMNIA, UNSPECIFIED TYPE: ICD-10-CM

## 2020-12-09 RX ORDER — ZOLPIDEM TARTRATE 5 MG/1
5 TABLET ORAL
Qty: 30 TABLET | Refills: 3 | Status: SHIPPED | OUTPATIENT
Start: 2020-12-09 | End: 2021-03-25

## 2020-12-09 NOTE — TELEPHONE ENCOUNTER
ZOLPIDEM TARTRATE 5 MG TABLET  Last Written Prescription Date:  10/6/2020  Last Fill Quantity: 30,   # refills: 1  Last Office Visit : 4/3/2020  Future Office visit:  None    Routing refill request to provider for review/approval because:  Drug not on the [facility], [facility] or [facility] refill protocol or controlled substance      Barbara Sandhu RN  Central Triage Red Flags/Med Refills

## 2021-03-24 DIAGNOSIS — G47.00 INSOMNIA, UNSPECIFIED TYPE: ICD-10-CM

## 2021-03-25 RX ORDER — ZOLPIDEM TARTRATE 5 MG/1
5 TABLET ORAL
Qty: 30 TABLET | Refills: 3 | Status: SHIPPED | OUTPATIENT
Start: 2021-03-25 | End: 2021-07-15

## 2021-03-25 NOTE — TELEPHONE ENCOUNTER
Routing refill request to provider for review/approval because:  Drug not on the [facility] refill protocol     Saritha FRANZN, RN

## 2021-05-17 ENCOUNTER — IMMUNIZATION (OUTPATIENT)
Dept: LAB | Facility: CLINIC | Age: 31
End: 2021-05-17
Payer: COMMERCIAL

## 2021-05-18 ENCOUNTER — NURSE TRIAGE (OUTPATIENT)
Dept: NURSING | Facility: CLINIC | Age: 31
End: 2021-05-18

## 2021-05-18 NOTE — TELEPHONE ENCOUNTER
Radha calling to report:  - shortness of breath, at rest now and not doing anything but is catching her breath. Has asthma and not tried using her rescue inhaler  - lightheaded  - feels confused  - left arm and left leg numbness  - back and left shoulder hurts - received her Rakan COVID vaccine on her left arm.    Received J&J shot yesterday. States she started feeling lightheaded prior to her COVID vaccine and was worse when she got home yesterday.    Hx of asthma, and takes Ambien for anxiety.    Per protocol, advised ED due to SOB and left sided numbness. Care advice reviewed. Advised to use PRN inhaler for asthma. Patient verbalizes understanding. Advised to call back with further questions/concerns.     Mireille Angulo RN/[facility] Nurse Advisor        Reason for Disposition    Sounds like a severe, unusual reaction to the triager    Additional Information    Negative: [1] Difficulty breathing or swallowing AND [2] starts within 2 hours after injection    Negative: Sounds like a life-threatening emergency to the triager    Negative: Fever > 104 F (40 C)    Protocols used: CORONAVIRUS (COVID-19) VACCINE QUESTIONS AND [id number] 3.25

## 2021-07-15 DIAGNOSIS — G47.00 INSOMNIA, UNSPECIFIED TYPE: ICD-10-CM

## 2021-07-15 RX ORDER — ZOLPIDEM TARTRATE 5 MG/1
5 TABLET ORAL
Qty: 30 TABLET | Refills: 0 | Status: SHIPPED | OUTPATIENT
Start: 2021-07-15 | End: 2021-08-16

## 2021-07-16 NOTE — TELEPHONE ENCOUNTER
Refill completed.   Needs in person follow up as has not been seen in over a year  Can do this as a physical

## 2021-07-20 ENCOUNTER — VIRTUAL VISIT (OUTPATIENT)
Dept: FAMILY MEDICINE | Facility: CLINIC | Age: 31
End: 2021-07-20
Payer: COMMERCIAL

## 2021-07-20 DIAGNOSIS — H81.10 BENIGN RECURRENT VERTIGO: Primary | ICD-10-CM

## 2021-07-20 DIAGNOSIS — G44.209 TENSION HEADACHE: ICD-10-CM

## 2021-07-20 PROCEDURE — 99214 OFFICE O/P EST MOD 30 MIN: CPT | Mod: 95 | Performed by: INTERNAL MEDICINE

## 2021-07-20 NOTE — PROGRESS NOTES
Radha is a 31 year old who is being evaluated via a billable video visit.      How would you like to obtain your AVS? MyChart  If the video visit is dropped, the invitation should be resent by: Text to cell phone: Mobile  Will anyone else be joining your video visit? No      Video Start Time: 10:37 AM    Assessment & Plan     1.  Benign recurrent vertigo.  Advised to use over-the-counter meclizine on a as needed basis.  There may be an association with recurrent headaches she gets.  Vertigo can be associated vascular headache.  I discussed that with patient and discuss treatment option for vascular headaches.  Though not clear that her headaches are vascular in origin.  I will refer her to physical therapy for the vertigo as well as for the headache.  2.  Tension headache versus vascular headache.  To me her symptoms are more suggestive of muscular tension headache since she is chronically having neck pain.  I am going to refer her to physical therapy.  I have asked her to make an appointment and see her primary provider, Nany Call to discuss treatment tension headache.  In thinking a low-dose Elavil might be helpful as a muscle relaxer as well as for insomnia                 Tobacco Cessation:   reports that she has been smoking cigarettes. She has been smoking about 0.30 packs per day. She has never used smokeless tobacco.          No follow-ups on file.    Denis Galan MD  [facility]   Radha is a 31 year old who presents for the following health issues     HPI       31-year-old young lady presents with couple of issues.  She has been experiencing vertigo off and on for the past 2 to 3 months.  She will experience a spinning movement with turning of head.  It does not last long and may be weeks in between before it reoccurs.  Yesterday she was changing lanes and when she turned her head back she had intense brief vertigo.   She also has a chronic issue of  headaches which she describes as migraine.  She uses Tylenol.  She says she has to shut her eyes and lay down in the dark when it happens.  She does not have any ocular symptoms but at times does have vertigo.  No other neurological symptoms.  The headache she feels is in the forehead like a ring and also neck at the same time.  It sounds more like a muscular tension headache than migraine to me.    She denies loss of hearing or issues with vision.  No tingling numbness.  She has been going to chiropractor 3 times a week and that did help her neck as she stopped because of cost.    Review of Systems   Constitutional, HEENT, cardiovascular, pulmonary, GI, , musculoskeletal, neuro, skin, endocrine and psych systems are negative, except as otherwise noted.      Objective           Vitals:  No vitals were obtained today due to virtual visit.    Physical Exam   GENERAL: Healthy, alert and no distress  EYES: Eyes grossly normal to inspection.  No discharge or erythema, or obvious scleral/conjunctival abnormalities.  RESP: No audible wheeze, cough, or visible cyanosis.  No visible retractions or increased work of breathing.    SKIN: Visible skin clear. No significant rash, abnormal pigmentation or lesions.  NEURO: Cranial nerves grossly intact.  Mentation and speech appropriate for age.  PSYCH: Mentation appears normal, affect normal/bright, judgement and insight intact, normal speech and appearance well-groomed.                Video-Visit Details    Type of service:  Video Visit    Video End Time:11:08 AM    Originating Location (pt. Location): Home    Distant Location (provider location):  [facility]     Platform used for Video Visit: iSpecimen
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
## 2021-08-03 ENCOUNTER — ANCILLARY PROCEDURE (OUTPATIENT)
Dept: GENERAL RADIOLOGY | Facility: CLINIC | Age: 31
End: 2021-08-03
Attending: NURSE PRACTITIONER
Payer: COMMERCIAL

## 2021-08-03 ENCOUNTER — OFFICE VISIT (OUTPATIENT)
Dept: FAMILY MEDICINE | Facility: CLINIC | Age: 31
End: 2021-08-03
Payer: COMMERCIAL

## 2021-08-03 VITALS
BODY MASS INDEX: 20.08 KG/M2 | WEIGHT: 108 LBS | DIASTOLIC BLOOD PRESSURE: 76 MMHG | HEART RATE: 102 BPM | OXYGEN SATURATION: 98 % | SYSTOLIC BLOOD PRESSURE: 116 MMHG

## 2021-08-03 DIAGNOSIS — M54.2 NECK PAIN: ICD-10-CM

## 2021-08-03 DIAGNOSIS — G44.209 MUSCLE TENSION HEADACHE: Primary | ICD-10-CM

## 2021-08-03 DIAGNOSIS — M62.830 SPASM OF THORACIC BACK MUSCLE: ICD-10-CM

## 2021-08-03 DIAGNOSIS — F41.8 DEPRESSION WITH ANXIETY: ICD-10-CM

## 2021-08-03 DIAGNOSIS — G44.209 MUSCLE TENSION HEADACHE: ICD-10-CM

## 2021-08-03 PROCEDURE — 99214 OFFICE O/P EST MOD 30 MIN: CPT | Performed by: NURSE PRACTITIONER

## 2021-08-03 PROCEDURE — 72040 X-RAY EXAM NECK SPINE 2-3 VW: CPT | Mod: FY | Performed by: FAMILY MEDICINE

## 2021-08-03 RX ORDER — NAPROXEN 500 MG/1
500 TABLET ORAL 2 TIMES DAILY PRN
Qty: 30 TABLET | Refills: 1 | Status: SHIPPED | OUTPATIENT
Start: 2021-08-03

## 2021-08-03 RX ORDER — CYCLOBENZAPRINE HCL 10 MG
10 TABLET ORAL 3 TIMES DAILY PRN
Qty: 30 TABLET | Refills: 1 | Status: SHIPPED | OUTPATIENT
Start: 2021-08-03

## 2021-08-04 ENCOUNTER — TELEPHONE (OUTPATIENT)
Dept: BEHAVIORAL HEALTH | Facility: CLINIC | Age: 31
End: 2021-08-04

## 2021-08-04 NOTE — TELEPHONE ENCOUNTER
Reached out to pt to offer [facility] appt per the request of YVES Duarte. Scheduled for 8/13/21.

## 2021-08-04 NOTE — RESULT ENCOUNTER NOTE
Keshav Galvan,    Attached are your test results.  Xray is normal except it say rever of curvature which could be consistent with amount of muscle tension you have there    Please contact us if you have any questions.    Nany Call, CNP

## 2021-08-10 ASSESSMENT — ANXIETY QUESTIONNAIRES
7. FEELING AFRAID AS IF SOMETHING AWFUL MIGHT HAPPEN: NOT AT ALL
8. IF YOU CHECKED OFF ANY PROBLEMS, HOW DIFFICULT HAVE THESE MADE IT FOR YOU TO DO YOUR WORK, TAKE CARE OF THINGS AT HOME, OR GET ALONG WITH OTHER PEOPLE?: SOMEWHAT DIFFICULT
GAD7 TOTAL SCORE: 2
3. WORRYING TOO MUCH ABOUT DIFFERENT THINGS: SEVERAL DAYS
6. BECOMING EASILY ANNOYED OR IRRITABLE: NOT AT ALL
1. FEELING NERVOUS, ANXIOUS, OR ON EDGE: SEVERAL DAYS
2. NOT BEING ABLE TO STOP OR CONTROL WORRYING: NOT AT ALL
5. BEING SO RESTLESS THAT IT IS HARD TO SIT STILL: NOT AT ALL
GAD7 TOTAL SCORE: 2
GAD7 TOTAL SCORE: 2
4. TROUBLE RELAXING: NOT AT ALL
7. FEELING AFRAID AS IF SOMETHING AWFUL MIGHT HAPPEN: NOT AT ALL

## 2021-08-11 ASSESSMENT — ANXIETY QUESTIONNAIRES: GAD7 TOTAL SCORE: 2

## 2021-08-13 ENCOUNTER — VIRTUAL VISIT (OUTPATIENT)
Dept: BEHAVIORAL HEALTH | Facility: CLINIC | Age: 31
End: 2021-08-13
Payer: COMMERCIAL

## 2021-08-13 DIAGNOSIS — F41.1 GENERALIZED ANXIETY DISORDER: ICD-10-CM

## 2021-08-13 DIAGNOSIS — F43.10 PTSD (POST-TRAUMATIC STRESS DISORDER): Primary | ICD-10-CM

## 2021-08-13 PROCEDURE — 90837 PSYTX W PT 60 MINUTES: CPT | Mod: 95 | Performed by: SOCIAL WORKER

## 2021-08-13 ASSESSMENT — COLUMBIA-SUICIDE SEVERITY RATING SCALE - C-SSRS
2. HAVE YOU ACTUALLY HAD ANY THOUGHTS OF KILLING YOURSELF LIFETIME?: YES
MOST RECENT DATE: 60874
1. IN THE PAST MONTH, HAVE YOU WISHED YOU WERE DEAD OR WISHED YOU COULD GO TO SLEEP AND NOT WAKE UP?: YES
TOTAL  NUMBER OF ACTUAL ATTEMPTS LIFETIME: 1
2. HAVE YOU ACTUALLY HAD ANY THOUGHTS OF KILLING YOURSELF?: NO
TOTAL  NUMBER OF ABORTED OR SELF INTERRUPTED ATTEMPTS PAST LIFETIME: NO
LETHALITY/MEDICAL DAMAGE CODE MOST RECENT POTENTIAL ATTEMPT: BEHAVIOR LIKELY TO RESULT IN DEATH DESPITE AVAILABLE MEDICAL CARE
TOTAL  NUMBER OF INTERRUPTED ATTEMPTS PAST 3 MONTHS: 1
4. HAVE YOU HAD THESE THOUGHTS AND HAD SOME INTENTION OF ACTING ON THEM?: NO
TOTAL  NUMBER OF INTERRUPTED ATTEMPTS PAST 3 MONTHS: NO
6. HAVE YOU EVER DONE ANYTHING, STARTED TO DO ANYTHING, OR PREPARED TO DO ANYTHING TO END YOUR LIFE?: NO
3. HAVE YOU BEEN THINKING ABOUT HOW YOU MIGHT KILL YOURSELF?: NO
ATTEMPT PAST THREE MONTHS: NO
MOST LETHAL DATE: 60874
LETHALITY/MEDICAL DAMAGE CODE MOST LETHAL ACTUAL ATTEMPT: NO PHYSICAL DAMAGE OR VERY MINOR PHYSICAL DAMAGE
TOTAL  NUMBER OF ABORTED OR SELF INTERRUPTED ATTEMPTS PAST 3 MONTHS: NO
LETHALITY/MEDICAL DAMAGE CODE FIRST ACTUAL ATTEMPT: NO PHYSICAL DAMAGE OR VERY MINOR PHYSICAL DAMAGE
REASONS FOR IDEATION LIFETIME: MOSTLY TO END OR STOP THE PAIN (YOU COULDN'T GO ON LIVING WITH THE PAIN OR HOW YOU WERE FEELING)
6. HAVE YOU EVER DONE ANYTHING, STARTED TO DO ANYTHING, OR PREPARED TO DO ANYTHING TO END YOUR LIFE?: NO
TOTAL  NUMBER OF INTERRUPTED ATTEMPTS LIFETIME: YES
LETHALITY/MEDICAL DAMAGE CODE FIRST PROTENTIAL ATTEMPT: BEHAVIOR LIKELY TO RESULT IN DEATH DESPITE AVAILABLEL MEDICAL CARE
4. HAVE YOU HAD THESE THOUGHTS AND HAD SOME INTENTION OF ACTING ON THEM?: NO
LETHALITY/MEDICAL DAMAGE CODE MOST RECENT ACTUAL ATTEMPT: NO PHYSICAL DAMAGE OR VERY MINOR PHYSICAL DAMAGE
1. IN THE PAST MONTH, HAVE YOU WISHED YOU WERE DEAD OR WISHED YOU COULD GO TO SLEEP AND NOT WAKE UP?: NO
5. HAVE YOU STARTED TO WORK OUT OR WORKED OUT THE DETAILS OF HOW TO KILL YOURSELF? DO YOU INTEND TO CARRY OUT THIS PLAN?: YES
ATTEMPT LIFETIME: YES
5. HAVE YOU STARTED TO WORK OUT OR WORKED OUT THE DETAILS OF HOW TO KILL YOURSELF? DO YOU INTEND TO CARRY OUT THIS PLAN?: NO
LETHALITY/MEDICAL DAMAGE CODE FIRST POTENTIAL ATTEMPT: BEHAVIOR LIKELY TO RESULT IN DEATH DESPITE AVAILABLE MEDICAL CARE

## 2021-08-13 ASSESSMENT — PATIENT HEALTH QUESTIONNAIRE - PHQ9
10. IF YOU CHECKED OFF ANY PROBLEMS, HOW DIFFICULT HAVE THESE PROBLEMS MADE IT FOR YOU TO DO YOUR WORK, TAKE CARE OF THINGS AT HOME, OR GET ALONG WITH OTHER PEOPLE: SOMEWHAT DIFFICULT
SUM OF ALL RESPONSES TO PHQ QUESTIONS 1-9: 2
SUM OF ALL RESPONSES TO PHQ QUESTIONS 1-9: 2
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
## 2021-08-14 ASSESSMENT — PATIENT HEALTH QUESTIONNAIRE - PHQ9: SUM OF ALL RESPONSES TO PHQ QUESTIONS 1-9: 2

## 2021-08-20 ENCOUNTER — VIRTUAL VISIT (OUTPATIENT)
Dept: BEHAVIORAL HEALTH | Facility: CLINIC | Age: 31
End: 2021-08-20
Payer: COMMERCIAL

## 2021-08-20 DIAGNOSIS — F41.1 GENERALIZED ANXIETY DISORDER: Primary | ICD-10-CM

## 2021-08-20 DIAGNOSIS — F43.10 PTSD (POST-TRAUMATIC STRESS DISORDER): ICD-10-CM

## 2021-08-20 PROCEDURE — 90832 PSYTX W PT 30 MINUTES: CPT | Mod: 95 | Performed by: SOCIAL WORKER
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
## 2021-09-03 ENCOUNTER — VIRTUAL VISIT (OUTPATIENT)
Dept: BEHAVIORAL HEALTH | Facility: CLINIC | Age: 31
End: 2021-09-03
Payer: COMMERCIAL

## 2021-09-03 DIAGNOSIS — F41.1 GENERALIZED ANXIETY DISORDER: Primary | ICD-10-CM

## 2021-09-03 DIAGNOSIS — F43.10 PTSD (POST-TRAUMATIC STRESS DISORDER): ICD-10-CM

## 2021-09-03 PROCEDURE — 90832 PSYTX W PT 30 MINUTES: CPT | Mod: 95 | Performed by: SOCIAL WORKER
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
## 2021-09-25 ENCOUNTER — HEALTH MAINTENANCE LETTER (OUTPATIENT)
Age: 31
End: 2021-09-25

## 2021-11-30 ENCOUNTER — TELEPHONE (OUTPATIENT)
Dept: BEHAVIORAL HEALTH | Facility: CLINIC | Age: 31
End: 2021-11-30
Payer: COMMERCIAL

## 2021-12-02 ENCOUNTER — VIRTUAL VISIT (OUTPATIENT)
Dept: PSYCHOLOGY | Facility: CLINIC | Age: 31
End: 2021-12-02
Payer: COMMERCIAL

## 2021-12-02 ENCOUNTER — TELEPHONE (OUTPATIENT)
Dept: PSYCHOLOGY | Facility: CLINIC | Age: 31
End: 2021-12-02
Payer: COMMERCIAL

## 2021-12-02 DIAGNOSIS — Z53.9 NO SHOW: Primary | ICD-10-CM

## 2021-12-02 DIAGNOSIS — F43.10 PTSD (POST-TRAUMATIC STRESS DISORDER): ICD-10-CM

## 2021-12-02 DIAGNOSIS — F41.1 GENERALIZED ANXIETY DISORDER: ICD-10-CM

## 2021-12-02 NOTE — TELEPHONE ENCOUNTER
Therapist called client's cell and home phone after she did not join appointment via video and left voice messages.  Client sent a mychart message, sharing uncertainty about appointment.  Therapist responded with purpose of appointment and inquired about whether client is still interested in engaging in individual therapy.  Waiting for response from client.

## 2021-12-13 DIAGNOSIS — G47.00 INSOMNIA, UNSPECIFIED TYPE: ICD-10-CM

## 2021-12-14 RX ORDER — ZOLPIDEM TARTRATE 5 MG/1
5 TABLET ORAL
Qty: 30 TABLET | Refills: 3 | Status: SHIPPED | OUTPATIENT
Start: 2021-12-14 | End: 2022-04-25

## 2022-01-15 ENCOUNTER — HEALTH MAINTENANCE LETTER (OUTPATIENT)
Age: 32
End: 2022-01-15

## 2022-04-23 DIAGNOSIS — G47.00 INSOMNIA, UNSPECIFIED TYPE: ICD-10-CM

## 2022-04-25 RX ORDER — ZOLPIDEM TARTRATE 5 MG/1
5 TABLET ORAL
Qty: 30 TABLET | Refills: 2 | Status: SHIPPED | OUTPATIENT
Start: 2022-04-25 | End: 2022-07-25

## 2022-04-25 NOTE — TELEPHONE ENCOUNTER
Routing refill request to provider for review/approval because:  Drug not on the [facility] refill protocol.    Ange Melton RN, BSN  [facility]

## 2022-09-19 DIAGNOSIS — G47.00 INSOMNIA, UNSPECIFIED TYPE: ICD-10-CM

## 2022-09-19 RX ORDER — ZOLPIDEM TARTRATE 5 MG/1
5 TABLET ORAL
Qty: 30 TABLET | Refills: 0 | OUTPATIENT
Start: 2022-09-19

## 2022-09-19 NOTE — TELEPHONE ENCOUNTER
Left message for patient with message below from [facility], unable to fill prescription, it has been over a year and [place], will need to see a provider there

## 2022-09-19 NOTE — TELEPHONE ENCOUNTER
Have not seen in over a year can not fill   Lives in different [place] and needs appointment with provider there

## 2023-04-22 ENCOUNTER — HEALTH MAINTENANCE LETTER (OUTPATIENT)
Age: 33
End: 2023-04-22

## 2024-06-29 ENCOUNTER — HEALTH MAINTENANCE LETTER (OUTPATIENT)
Age: 34
End: 2024-06-29